# Patient Record
Sex: FEMALE | Race: OTHER | HISPANIC OR LATINO
[De-identification: names, ages, dates, MRNs, and addresses within clinical notes are randomized per-mention and may not be internally consistent; named-entity substitution may affect disease eponyms.]

---

## 2017-08-29 PROBLEM — Z00.00 ENCOUNTER FOR PREVENTIVE HEALTH EXAMINATION: Status: ACTIVE | Noted: 2017-08-29

## 2017-09-06 ENCOUNTER — APPOINTMENT (OUTPATIENT)
Dept: ANTEPARTUM | Facility: CLINIC | Age: 22
End: 2017-09-06
Payer: SELF-PAY

## 2017-09-06 ENCOUNTER — ASOB RESULT (OUTPATIENT)
Age: 22
End: 2017-09-06

## 2017-09-06 PROCEDURE — 76805 OB US >/= 14 WKS SNGL FETUS: CPT

## 2017-09-06 PROCEDURE — 76819 FETAL BIOPHYS PROFIL W/O NST: CPT

## 2017-10-30 ENCOUNTER — ASOB RESULT (OUTPATIENT)
Age: 22
End: 2017-10-30

## 2017-10-30 ENCOUNTER — APPOINTMENT (OUTPATIENT)
Dept: ANTEPARTUM | Facility: CLINIC | Age: 22
End: 2017-10-30
Payer: MEDICAID

## 2017-10-30 VITALS
DIASTOLIC BLOOD PRESSURE: 68 MMHG | RESPIRATION RATE: 16 BRPM | OXYGEN SATURATION: 98 % | HEART RATE: 76 BPM | SYSTOLIC BLOOD PRESSURE: 120 MMHG

## 2017-10-30 PROCEDURE — 76818 FETAL BIOPHYS PROFILE W/NST: CPT

## 2017-10-30 PROCEDURE — 76816 OB US FOLLOW-UP PER FETUS: CPT

## 2017-10-30 RX ORDER — VITAMIN C, CALCIUM, IRON, VITAMIN D3, VITAMIN E, VITAMIN B1, VITAMIN B2, VITAMIN B3, VITAMIN B6, FOLIC ACID, IODINE, ZINC, COPPER, DOCUSATE SODIUM, DOCOSAHEXAENOIC ACID (DHA) 27-1-50 MG
KIT ORAL
Refills: 0 | Status: ACTIVE | COMMUNITY

## 2017-11-02 ENCOUNTER — ASOB RESULT (OUTPATIENT)
Age: 22
End: 2017-11-02

## 2017-11-02 ENCOUNTER — APPOINTMENT (OUTPATIENT)
Dept: ANTEPARTUM | Facility: CLINIC | Age: 22
End: 2017-11-02
Payer: MEDICAID

## 2017-11-02 DIAGNOSIS — O48.0 POST-TERM PREGNANCY: ICD-10-CM

## 2017-11-02 PROCEDURE — 76818 FETAL BIOPHYS PROFILE W/NST: CPT

## 2017-11-02 PROCEDURE — 76815 OB US LIMITED FETUS(S): CPT

## 2017-11-03 ENCOUNTER — INPATIENT (INPATIENT)
Facility: HOSPITAL | Age: 22
LOS: 3 days | Discharge: HOME | End: 2017-11-07
Attending: OBSTETRICS & GYNECOLOGY | Admitting: OBSTETRICS & GYNECOLOGY
Payer: COMMERCIAL

## 2017-11-03 VITALS — WEIGHT: 213.85 LBS | HEIGHT: 59 IN

## 2017-11-03 DIAGNOSIS — O47.1 FALSE LABOR AT OR AFTER 37 COMPLETED WEEKS OF GESTATION: ICD-10-CM

## 2017-11-03 LAB
ABO RH CONFIRMATION: SIGNIFICANT CHANGE UP
ANISOCYTOSIS BLD QL: SLIGHT — SIGNIFICANT CHANGE UP
APPEARANCE UR: CLEAR — SIGNIFICANT CHANGE UP
BASOPHILS NFR BLD AUTO: 1 % — SIGNIFICANT CHANGE UP (ref 0–2)
BILIRUB UR-MCNC: NEGATIVE — SIGNIFICANT CHANGE UP
BLD GP AB SCN SERPL QL: SIGNIFICANT CHANGE UP
COLOR SPEC: YELLOW — SIGNIFICANT CHANGE UP
DIFF PNL FLD: NEGATIVE — SIGNIFICANT CHANGE UP
EOSINOPHIL NFR BLD AUTO: 1 % — SIGNIFICANT CHANGE UP (ref 0–5)
EPI CELLS # UR: SIGNIFICANT CHANGE UP
GLUCOSE UR QL: NEGATIVE MG/DL — SIGNIFICANT CHANGE UP
HCT VFR BLD CALC: 33.7 % — LOW (ref 37–47)
HGB BLD-MCNC: 11.1 G/DL — LOW (ref 12–16)
KETONES UR-MCNC: NEGATIVE — SIGNIFICANT CHANGE UP
LEUKOCYTE ESTERASE UR-ACNC: ABNORMAL
LYMPHOCYTES # BLD AUTO: 22 % — SIGNIFICANT CHANGE UP (ref 20–55)
MACROCYTES BLD QL: SLIGHT — SIGNIFICANT CHANGE UP
MANUAL DIF COMMENT BLD-IMP: SIGNIFICANT CHANGE UP
MCHC RBC-ENTMCNC: 23.8 PG — LOW (ref 27–31)
MCHC RBC-ENTMCNC: 32.9 G/DL — SIGNIFICANT CHANGE UP (ref 32–36)
MCV RBC AUTO: 72.2 FL — LOW (ref 81–99)
MICROCYTES BLD QL: SLIGHT — SIGNIFICANT CHANGE UP
MONOCYTES NFR BLD AUTO: 6 % — SIGNIFICANT CHANGE UP (ref 3–10)
NEUTROPHILS NFR BLD AUTO: 68 % — SIGNIFICANT CHANGE UP (ref 37–73)
NEUTS BAND # BLD: 1 % — SIGNIFICANT CHANGE UP (ref 0–8)
NITRITE UR-MCNC: NEGATIVE — SIGNIFICANT CHANGE UP
PH UR: 7 — SIGNIFICANT CHANGE UP (ref 5–8)
PLAT MORPH BLD: NORMAL — SIGNIFICANT CHANGE UP
PLATELET # BLD AUTO: 273 K/UL — SIGNIFICANT CHANGE UP (ref 150–400)
PROT UR-MCNC: 30 MG/DL
RBC # BLD: 4.67 M/UL — SIGNIFICANT CHANGE UP (ref 4.4–5.2)
RBC # FLD: 16.6 % — HIGH (ref 11–15.6)
RBC BLD AUTO: PRESENT — SIGNIFICANT CHANGE UP
SP GR SPEC: 1.01 — SIGNIFICANT CHANGE UP (ref 1.01–1.02)
T PALLIDUM AB TITR SER: NEGATIVE — SIGNIFICANT CHANGE UP
TYPE + AB SCN PNL BLD: SIGNIFICANT CHANGE UP
UROBILINOGEN FLD QL: NEGATIVE MG/DL — SIGNIFICANT CHANGE UP
VARIANT LYMPHS # BLD: 1 % — SIGNIFICANT CHANGE UP (ref 0–6)
WBC # BLD: 10 K/UL — SIGNIFICANT CHANGE UP (ref 4.8–10.8)
WBC # FLD AUTO: 10 K/UL — SIGNIFICANT CHANGE UP (ref 4.8–10.8)
WBC UR QL: SIGNIFICANT CHANGE UP

## 2017-11-03 RX ORDER — OXYTOCIN 10 UNIT/ML
125 VIAL (ML) INJECTION
Qty: 20 | Refills: 0 | Status: DISCONTINUED | OUTPATIENT
Start: 2017-11-03 | End: 2017-11-04

## 2017-11-03 RX ORDER — SODIUM CHLORIDE 9 MG/ML
1000 INJECTION, SOLUTION INTRAVENOUS ONCE
Qty: 0 | Refills: 0 | Status: COMPLETED | OUTPATIENT
Start: 2017-11-03 | End: 2017-11-04

## 2017-11-03 RX ORDER — CITRIC ACID/SODIUM CITRATE 300-500 MG
30 SOLUTION, ORAL ORAL ONCE
Qty: 0 | Refills: 0 | Status: COMPLETED | OUTPATIENT
Start: 2017-11-03 | End: 2017-11-04

## 2017-11-03 RX ORDER — SODIUM CHLORIDE 9 MG/ML
1000 INJECTION, SOLUTION INTRAVENOUS
Qty: 0 | Refills: 0 | Status: DISCONTINUED | OUTPATIENT
Start: 2017-11-03 | End: 2017-11-04

## 2017-11-03 RX ADMIN — SODIUM CHLORIDE 125 MILLILITER(S): 9 INJECTION, SOLUTION INTRAVENOUS at 11:15

## 2017-11-04 ENCOUNTER — TRANSCRIPTION ENCOUNTER (OUTPATIENT)
Age: 22
End: 2017-11-04

## 2017-11-04 ENCOUNTER — RESULT REVIEW (OUTPATIENT)
Age: 22
End: 2017-11-04

## 2017-11-04 LAB
BASE EXCESS BLDCOA CALC-SCNC: -7.9 MMOL/L — SIGNIFICANT CHANGE UP (ref -11.6–0.4)
BASE EXCESS BLDCOV CALC-SCNC: -7.4 MMOL/L — SIGNIFICANT CHANGE UP (ref -9.3–0.3)
GAS PNL BLDCOV: 7.29 — SIGNIFICANT CHANGE UP (ref 7.11–7.36)
HCO3 BLDCOA-SCNC: 19 MMOL/L — SIGNIFICANT CHANGE UP (ref 15–27)
HCO3 BLDCOV-SCNC: 19 MMOL/L — SIGNIFICANT CHANGE UP (ref 17–25)
PCO2 BLDCOA: 43.4 MMHG — SIGNIFICANT CHANGE UP (ref 32.2–65.8)
PCO2 BLDCOV: 38 MMHG — SIGNIFICANT CHANGE UP (ref 27–49.4)
PH BLDCOA: 7.25 — SIGNIFICANT CHANGE UP (ref 7.11–7.36)
PO2 BLDCOA: 23.7 MMHG — SIGNIFICANT CHANGE UP (ref 6–30)
PO2 BLDCOA: 29.4 MMHG — SIGNIFICANT CHANGE UP (ref 17.4–41)
SAO2 % BLDCOA: SIGNIFICANT CHANGE UP
SAO2 % BLDCOV: SIGNIFICANT CHANGE UP

## 2017-11-04 PROCEDURE — 88307 TISSUE EXAM BY PATHOLOGIST: CPT | Mod: 26

## 2017-11-04 PROCEDURE — 59409 OBSTETRICAL CARE: CPT | Mod: U9

## 2017-11-04 RX ORDER — OXYTOCIN 10 UNIT/ML
2 VIAL (ML) INJECTION
Qty: 30 | Refills: 0 | Status: DISCONTINUED | OUTPATIENT
Start: 2017-11-04 | End: 2017-11-04

## 2017-11-04 RX ORDER — IBUPROFEN 200 MG
600 TABLET ORAL EVERY 6 HOURS
Qty: 0 | Refills: 0 | Status: DISCONTINUED | OUTPATIENT
Start: 2017-11-05 | End: 2017-11-07

## 2017-11-04 RX ORDER — SODIUM CHLORIDE 9 MG/ML
1000 INJECTION, SOLUTION INTRAVENOUS
Qty: 0 | Refills: 0 | Status: DISCONTINUED | OUTPATIENT
Start: 2017-11-05 | End: 2017-11-07

## 2017-11-04 RX ORDER — CEFAZOLIN SODIUM 1 G
2000 VIAL (EA) INJECTION ONCE
Qty: 0 | Refills: 0 | Status: COMPLETED | OUTPATIENT
Start: 2017-11-04 | End: 2017-11-04

## 2017-11-04 RX ORDER — KETOROLAC TROMETHAMINE 30 MG/ML
30 SYRINGE (ML) INJECTION ONCE
Qty: 0 | Refills: 0 | Status: DISCONTINUED | OUTPATIENT
Start: 2017-11-05 | End: 2017-11-07

## 2017-11-04 RX ORDER — NALOXONE HYDROCHLORIDE 4 MG/.1ML
0.1 SPRAY NASAL
Qty: 0 | Refills: 0 | Status: DISCONTINUED | OUTPATIENT
Start: 2017-11-05 | End: 2017-11-07

## 2017-11-04 RX ORDER — SIMETHICONE 80 MG/1
80 TABLET, CHEWABLE ORAL EVERY 4 HOURS
Qty: 0 | Refills: 0 | Status: DISCONTINUED | OUTPATIENT
Start: 2017-11-05 | End: 2017-11-07

## 2017-11-04 RX ORDER — OXYTOCIN 10 UNIT/ML
41.67 VIAL (ML) INJECTION
Qty: 20 | Refills: 0 | Status: DISCONTINUED | OUTPATIENT
Start: 2017-11-05 | End: 2017-11-07

## 2017-11-04 RX ORDER — TETANUS TOXOID, REDUCED DIPHTHERIA TOXOID AND ACELLULAR PERTUSSIS VACCINE, ADSORBED 5; 2.5; 8; 8; 2.5 [IU]/.5ML; [IU]/.5ML; UG/.5ML; UG/.5ML; UG/.5ML
0.5 SUSPENSION INTRAMUSCULAR ONCE
Qty: 0 | Refills: 0 | Status: DISCONTINUED | OUTPATIENT
Start: 2017-11-05 | End: 2017-11-07

## 2017-11-04 RX ORDER — DIPHENHYDRAMINE HCL 50 MG
25 CAPSULE ORAL ONCE
Qty: 0 | Refills: 0 | Status: DISCONTINUED | OUTPATIENT
Start: 2017-11-05 | End: 2017-11-07

## 2017-11-04 RX ORDER — OXYCODONE AND ACETAMINOPHEN 5; 325 MG/1; MG/1
1 TABLET ORAL
Qty: 0 | Refills: 0 | Status: DISCONTINUED | OUTPATIENT
Start: 2017-11-05 | End: 2017-11-07

## 2017-11-04 RX ORDER — ACETAMINOPHEN 500 MG
650 TABLET ORAL EVERY 6 HOURS
Qty: 0 | Refills: 0 | Status: DISCONTINUED | OUTPATIENT
Start: 2017-11-05 | End: 2017-11-07

## 2017-11-04 RX ORDER — OXYTOCIN 10 UNIT/ML
333.33 VIAL (ML) INJECTION
Qty: 20 | Refills: 0 | Status: DISCONTINUED | OUTPATIENT
Start: 2017-11-04 | End: 2017-11-07

## 2017-11-04 RX ORDER — LANOLIN
1 OINTMENT (GRAM) TOPICAL
Qty: 0 | Refills: 0 | Status: DISCONTINUED | OUTPATIENT
Start: 2017-11-05 | End: 2017-11-07

## 2017-11-04 RX ORDER — ONDANSETRON 8 MG/1
4 TABLET, FILM COATED ORAL ONCE
Qty: 0 | Refills: 0 | Status: DISCONTINUED | OUTPATIENT
Start: 2017-11-05 | End: 2017-11-07

## 2017-11-04 RX ORDER — FERROUS SULFATE 325(65) MG
325 TABLET ORAL DAILY
Qty: 0 | Refills: 0 | Status: DISCONTINUED | OUTPATIENT
Start: 2017-11-05 | End: 2017-11-07

## 2017-11-04 RX ORDER — DOCUSATE SODIUM 100 MG
100 CAPSULE ORAL
Qty: 0 | Refills: 0 | Status: DISCONTINUED | OUTPATIENT
Start: 2017-11-05 | End: 2017-11-07

## 2017-11-04 RX ORDER — DIPHENHYDRAMINE HCL 50 MG
25 CAPSULE ORAL EVERY 4 HOURS
Qty: 0 | Refills: 0 | Status: DISCONTINUED | OUTPATIENT
Start: 2017-11-05 | End: 2017-11-07

## 2017-11-04 RX ORDER — KETOROLAC TROMETHAMINE 30 MG/ML
30 SYRINGE (ML) INJECTION EVERY 6 HOURS
Qty: 0 | Refills: 0 | Status: DISCONTINUED | OUTPATIENT
Start: 2017-11-05 | End: 2017-11-07

## 2017-11-04 RX ORDER — ACETAMINOPHEN 500 MG
1000 TABLET ORAL ONCE
Qty: 0 | Refills: 0 | Status: COMPLETED | OUTPATIENT
Start: 2017-11-04 | End: 2017-11-04

## 2017-11-04 RX ORDER — ACETAMINOPHEN 500 MG
1000 TABLET ORAL ONCE
Qty: 0 | Refills: 0 | Status: DISCONTINUED | OUTPATIENT
Start: 2017-11-05 | End: 2017-11-07

## 2017-11-04 RX ORDER — GLYCERIN ADULT
1 SUPPOSITORY, RECTAL RECTAL AT BEDTIME
Qty: 0 | Refills: 0 | Status: DISCONTINUED | OUTPATIENT
Start: 2017-11-05 | End: 2017-11-07

## 2017-11-04 RX ORDER — OXYTOCIN 10 UNIT/ML
41.67 VIAL (ML) INJECTION
Qty: 20 | Refills: 0 | Status: DISCONTINUED | OUTPATIENT
Start: 2017-11-04 | End: 2017-11-07

## 2017-11-04 RX ORDER — ONDANSETRON 8 MG/1
4 TABLET, FILM COATED ORAL EVERY 6 HOURS
Qty: 0 | Refills: 0 | Status: DISCONTINUED | OUTPATIENT
Start: 2017-11-05 | End: 2017-11-07

## 2017-11-04 RX ORDER — DIPHENHYDRAMINE HCL 50 MG
25 CAPSULE ORAL EVERY 6 HOURS
Qty: 0 | Refills: 0 | Status: DISCONTINUED | OUTPATIENT
Start: 2017-11-05 | End: 2017-11-07

## 2017-11-04 RX ORDER — OXYCODONE AND ACETAMINOPHEN 5; 325 MG/1; MG/1
2 TABLET ORAL EVERY 6 HOURS
Qty: 0 | Refills: 0 | Status: DISCONTINUED | OUTPATIENT
Start: 2017-11-05 | End: 2017-11-07

## 2017-11-04 RX ADMIN — Medication 30 MILLILITER(S): at 17:01

## 2017-11-04 RX ADMIN — Medication 0.2 MILLIGRAM(S): at 20:39

## 2017-11-04 RX ADMIN — Medication 2 MILLIUNIT(S)/MIN: at 14:58

## 2017-11-04 RX ADMIN — SODIUM CHLORIDE 125 MILLILITER(S): 9 INJECTION, SOLUTION INTRAVENOUS at 03:00

## 2017-11-04 RX ADMIN — SODIUM CHLORIDE 1000 MILLILITER(S): 9 INJECTION INTRAMUSCULAR; INTRAVENOUS; SUBCUTANEOUS at 21:05

## 2017-11-04 RX ADMIN — CARBOPROST TROMETHAMINE 250 MICROGRAM(S): 250 INJECTION, SOLUTION INTRAMUSCULAR at 20:44

## 2017-11-04 RX ADMIN — CARBOPROST TROMETHAMINE 250 MICROGRAM(S): 250 INJECTION, SOLUTION INTRAMUSCULAR at 20:41

## 2017-11-04 RX ADMIN — Medication 400 MILLIGRAM(S): at 23:38

## 2017-11-04 RX ADMIN — SODIUM CHLORIDE 2000 MILLILITER(S): 9 INJECTION, SOLUTION INTRAVENOUS at 10:41

## 2017-11-04 RX ADMIN — Medication 100 MILLIGRAM(S): at 20:10

## 2017-11-04 RX ADMIN — Medication 1000 MILLIUNIT(S)/MIN: at 20:30

## 2017-11-04 RX ADMIN — SODIUM CHLORIDE 125 MILLILITER(S): 9 INJECTION, SOLUTION INTRAVENOUS at 17:01

## 2017-11-04 RX ADMIN — Medication 125 MILLIUNIT(S)/MIN: at 21:30

## 2017-11-05 ENCOUNTER — TRANSCRIPTION ENCOUNTER (OUTPATIENT)
Age: 22
End: 2017-11-05

## 2017-11-05 LAB
EOSINOPHIL # BLD AUTO: 0 K/UL — SIGNIFICANT CHANGE UP (ref 0–0.5)
EOSINOPHIL NFR BLD AUTO: 0 % — SIGNIFICANT CHANGE UP (ref 0–6)
HCT VFR BLD CALC: 33.5 % — LOW (ref 37–47)
HGB BLD-MCNC: 11.5 G/DL — LOW (ref 12–16)
LYMPHOCYTES # BLD AUTO: 1.1 K/UL — SIGNIFICANT CHANGE UP (ref 1–4.8)
LYMPHOCYTES # BLD AUTO: 6.7 % — LOW (ref 20–55)
MCHC RBC-ENTMCNC: 26 PG — LOW (ref 27–31)
MCHC RBC-ENTMCNC: 34.3 G/DL — SIGNIFICANT CHANGE UP (ref 32–36)
MCV RBC AUTO: 75.8 FL — LOW (ref 81–99)
MONOCYTES # BLD AUTO: 0.7 K/UL — SIGNIFICANT CHANGE UP (ref 0–0.8)
MONOCYTES NFR BLD AUTO: 4.3 % — SIGNIFICANT CHANGE UP (ref 3–10)
NEUTROPHILS # BLD AUTO: 14.9 K/UL — HIGH (ref 1.8–8)
NEUTROPHILS NFR BLD AUTO: 88.7 % — HIGH (ref 37–73)
PLATELET # BLD AUTO: 219 K/UL — SIGNIFICANT CHANGE UP (ref 150–400)
RBC # BLD: 4.42 M/UL — SIGNIFICANT CHANGE UP (ref 4.4–5.2)
RBC # FLD: 17.1 % — HIGH (ref 11–15.6)
WBC # BLD: 16.8 K/UL — HIGH (ref 4.8–10.8)
WBC # FLD AUTO: 16.8 K/UL — HIGH (ref 4.8–10.8)

## 2017-11-05 RX ORDER — CARBOPROST TROMETHAMINE 250 UG/ML
250 INJECTION, SOLUTION INTRAMUSCULAR ONCE
Qty: 0 | Refills: 0 | Status: COMPLETED | OUTPATIENT
Start: 2017-11-05 | End: 2017-11-04

## 2017-11-05 RX ORDER — ACETAMINOPHEN 500 MG
650 TABLET ORAL EVERY 6 HOURS
Qty: 0 | Refills: 0 | Status: DISCONTINUED | OUTPATIENT
Start: 2017-11-05 | End: 2017-11-05

## 2017-11-05 RX ORDER — LANOLIN
1 OINTMENT (GRAM) TOPICAL
Qty: 0 | Refills: 0 | Status: DISCONTINUED | OUTPATIENT
Start: 2017-11-05 | End: 2017-11-05

## 2017-11-05 RX ORDER — FERROUS SULFATE 325(65) MG
325 TABLET ORAL DAILY
Qty: 0 | Refills: 0 | Status: DISCONTINUED | OUTPATIENT
Start: 2017-11-05 | End: 2017-11-05

## 2017-11-05 RX ORDER — CEFAZOLIN SODIUM 1 G
2000 VIAL (EA) INJECTION EVERY 8 HOURS
Qty: 0 | Refills: 0 | Status: DISCONTINUED | OUTPATIENT
Start: 2017-11-05 | End: 2017-11-06

## 2017-11-05 RX ORDER — GLYCERIN ADULT
1 SUPPOSITORY, RECTAL RECTAL AT BEDTIME
Qty: 0 | Refills: 0 | Status: DISCONTINUED | OUTPATIENT
Start: 2017-11-05 | End: 2017-11-05

## 2017-11-05 RX ORDER — DOCUSATE SODIUM 100 MG
100 CAPSULE ORAL
Qty: 0 | Refills: 0 | Status: DISCONTINUED | OUTPATIENT
Start: 2017-11-05 | End: 2017-11-05

## 2017-11-05 RX ORDER — OXYTOCIN 10 UNIT/ML
41.67 VIAL (ML) INJECTION
Qty: 20 | Refills: 0 | Status: DISCONTINUED | OUTPATIENT
Start: 2017-11-05 | End: 2017-11-05

## 2017-11-05 RX ORDER — OXYCODONE AND ACETAMINOPHEN 5; 325 MG/1; MG/1
1 TABLET ORAL
Qty: 0 | Refills: 0 | Status: DISCONTINUED | OUTPATIENT
Start: 2017-11-05 | End: 2017-11-05

## 2017-11-05 RX ORDER — IBUPROFEN 200 MG
600 TABLET ORAL EVERY 6 HOURS
Qty: 0 | Refills: 0 | Status: DISCONTINUED | OUTPATIENT
Start: 2017-11-05 | End: 2017-11-05

## 2017-11-05 RX ORDER — OXYCODONE AND ACETAMINOPHEN 5; 325 MG/1; MG/1
2 TABLET ORAL EVERY 6 HOURS
Qty: 0 | Refills: 0 | Status: DISCONTINUED | OUTPATIENT
Start: 2017-11-05 | End: 2017-11-05

## 2017-11-05 RX ORDER — DIPHENHYDRAMINE HCL 50 MG
25 CAPSULE ORAL EVERY 6 HOURS
Qty: 0 | Refills: 0 | Status: DISCONTINUED | OUTPATIENT
Start: 2017-11-05 | End: 2017-11-05

## 2017-11-05 RX ORDER — SODIUM CHLORIDE 9 MG/ML
1000 INJECTION INTRAMUSCULAR; INTRAVENOUS; SUBCUTANEOUS ONCE
Qty: 0 | Refills: 0 | Status: COMPLETED | OUTPATIENT
Start: 2017-11-05 | End: 2017-11-04

## 2017-11-05 RX ORDER — SIMETHICONE 80 MG/1
80 TABLET, CHEWABLE ORAL EVERY 4 HOURS
Qty: 0 | Refills: 0 | Status: DISCONTINUED | OUTPATIENT
Start: 2017-11-05 | End: 2017-11-05

## 2017-11-05 RX ADMIN — Medication 100 MILLIGRAM(S): at 14:06

## 2017-11-05 RX ADMIN — Medication 1000 MILLIGRAM(S): at 00:05

## 2017-11-05 RX ADMIN — Medication 30 MILLIGRAM(S): at 18:35

## 2017-11-05 RX ADMIN — SODIUM CHLORIDE 125 MILLILITER(S): 9 INJECTION, SOLUTION INTRAVENOUS at 14:07

## 2017-11-05 RX ADMIN — Medication 100 MILLIGRAM(S): at 04:37

## 2017-11-05 RX ADMIN — Medication 30 MILLIGRAM(S): at 18:21

## 2017-11-05 RX ADMIN — Medication 30 MILLIGRAM(S): at 10:04

## 2017-11-05 RX ADMIN — Medication 30 MILLIGRAM(S): at 10:19

## 2017-11-05 RX ADMIN — Medication 100 MILLIGRAM(S): at 20:00

## 2017-11-05 NOTE — DISCHARGE NOTE ADULT - CARE PROVIDER_API CALL
[facility],   [address] [place] [address]  Phone: [phone]  Fax: (       -

## 2017-11-05 NOTE — DISCHARGE NOTE ADULT - PROVIDER TOKENS
FREE:[LAST:[HR],PHONE:[phone]],FAX:[(   )    -],ADDRESS:[[address] [place] [address]]]

## 2017-11-05 NOTE — PROGRESS NOTE ADULT - PROBLEM SELECTOR PLAN 2
Resolving, stable  -repeat H/H stable (11.1/33.7 on 11/3/17  to 11.5/33.5 postpartum on 11/5/17) Stable  -repeat H/H stable (11.1/33.7 on 11/3/17  to 11.5/33.5 postpartum on 11/5/17)

## 2017-11-05 NOTE — DISCHARGE NOTE ADULT - HOSPITAL COURSE
Patient is a  who delivered via  section. She was transferred to postpartum unit without complications during her stay. Upon discharge she is voiding, tolerating PO, ambulating, and pain is controlled.

## 2017-11-05 NOTE — DISCHARGE NOTE ADULT - MEDICATION SUMMARY - MEDICATIONS TO TAKE
I will START or STAY ON the medications listed below when I get home from the hospital:    ibuprofen 600 mg oral tablet  -- 1 tab(s) by mouth every 6 hours, As needed, Mild pain or headache  -- Indication: For moderate pain    Prenatal 1 oral capsule  -- 1  orally  -- Indication: For home med

## 2017-11-05 NOTE — DISCHARGE NOTE ADULT - PATIENT PORTAL LINK FT
“You can access the [employer] Patient Portal, offered by [facility], by registering with the following website: [email or web address]”

## 2017-11-05 NOTE — DISCHARGE NOTE ADULT - CARE PLAN
Principal Discharge DX:	 delivery delivered  Goal:	pain free  Instructions for follow-up, activity and diet:	Please follow up in our office in 5-7 days for wound check.  Please call sooner if there are any additional concerns.

## 2017-11-05 NOTE — DISCHARGE NOTE ADULT - PLAN OF CARE
pain free Please follow up in our office in 5-7 days for wound check.  Please call sooner if there are any additional concerns.

## 2017-11-06 LAB
BASOPHILS # BLD AUTO: 0 K/UL — SIGNIFICANT CHANGE UP (ref 0–0.2)
BASOPHILS NFR BLD AUTO: 0.2 % — SIGNIFICANT CHANGE UP (ref 0–2)
EOSINOPHIL # BLD AUTO: 0 K/UL — SIGNIFICANT CHANGE UP (ref 0–0.5)
EOSINOPHIL NFR BLD AUTO: 0.4 % — SIGNIFICANT CHANGE UP (ref 0–6)
HCT VFR BLD CALC: 23 % — LOW (ref 37–47)
HGB BLD-MCNC: 7.7 G/DL — LOW (ref 12–16)
LYMPHOCYTES # BLD AUTO: 2.8 K/UL — SIGNIFICANT CHANGE UP (ref 1–4.8)
LYMPHOCYTES # BLD AUTO: 25 % — SIGNIFICANT CHANGE UP (ref 20–55)
MCHC RBC-ENTMCNC: 25.8 PG — LOW (ref 27–31)
MCHC RBC-ENTMCNC: 33.5 G/DL — SIGNIFICANT CHANGE UP (ref 32–36)
MCV RBC AUTO: 77.2 FL — LOW (ref 81–99)
MONOCYTES # BLD AUTO: 0.8 K/UL — SIGNIFICANT CHANGE UP (ref 0–0.8)
MONOCYTES NFR BLD AUTO: 6.8 % — SIGNIFICANT CHANGE UP (ref 3–10)
NEUTROPHILS # BLD AUTO: 7.5 K/UL — SIGNIFICANT CHANGE UP (ref 1.8–8)
NEUTROPHILS NFR BLD AUTO: 67 % — SIGNIFICANT CHANGE UP (ref 37–73)
PLATELET # BLD AUTO: 185 K/UL — SIGNIFICANT CHANGE UP (ref 150–400)
RBC # BLD: 2.98 M/UL — LOW (ref 4.4–5.2)
RBC # FLD: 18.3 % — HIGH (ref 11–15.6)
WBC # BLD: 11.2 K/UL — HIGH (ref 4.8–10.8)
WBC # FLD AUTO: 11.2 K/UL — HIGH (ref 4.8–10.8)

## 2017-11-06 RX ORDER — IBUPROFEN 200 MG
1 TABLET ORAL
Qty: 56 | Refills: 0
Start: 2017-11-06 | End: 2017-11-20

## 2017-11-06 RX ADMIN — OXYCODONE AND ACETAMINOPHEN 1 TABLET(S): 5; 325 TABLET ORAL at 14:00

## 2017-11-06 RX ADMIN — OXYCODONE AND ACETAMINOPHEN 2 TABLET(S): 5; 325 TABLET ORAL at 03:52

## 2017-11-06 RX ADMIN — OXYCODONE AND ACETAMINOPHEN 1 TABLET(S): 5; 325 TABLET ORAL at 21:20

## 2017-11-06 RX ADMIN — OXYCODONE AND ACETAMINOPHEN 1 TABLET(S): 5; 325 TABLET ORAL at 13:01

## 2017-11-06 RX ADMIN — OXYCODONE AND ACETAMINOPHEN 2 TABLET(S): 5; 325 TABLET ORAL at 04:46

## 2017-11-06 RX ADMIN — OXYCODONE AND ACETAMINOPHEN 1 TABLET(S): 5; 325 TABLET ORAL at 22:15

## 2017-11-06 RX ADMIN — Medication 100 MILLIGRAM(S): at 03:53

## 2017-11-06 RX ADMIN — Medication 1 TABLET(S): at 13:01

## 2017-11-06 RX ADMIN — Medication 600 MILLIGRAM(S): at 08:10

## 2017-11-06 RX ADMIN — Medication 325 MILLIGRAM(S): at 13:07

## 2017-11-06 RX ADMIN — Medication 600 MILLIGRAM(S): at 09:00

## 2017-11-07 VITALS
RESPIRATION RATE: 18 BRPM | TEMPERATURE: 98 F | HEART RATE: 93 BPM | DIASTOLIC BLOOD PRESSURE: 70 MMHG | SYSTOLIC BLOOD PRESSURE: 113 MMHG

## 2017-11-07 PROCEDURE — 36415 COLL VENOUS BLD VENIPUNCTURE: CPT

## 2017-11-07 PROCEDURE — 85027 COMPLETE CBC AUTOMATED: CPT

## 2017-11-07 PROCEDURE — T1013: CPT

## 2017-11-07 PROCEDURE — 86850 RBC ANTIBODY SCREEN: CPT

## 2017-11-07 PROCEDURE — 86920 COMPATIBILITY TEST SPIN: CPT

## 2017-11-07 PROCEDURE — 86780 TREPONEMA PALLIDUM: CPT

## 2017-11-07 PROCEDURE — 36430 TRANSFUSION BLD/BLD COMPNT: CPT

## 2017-11-07 PROCEDURE — 88307 TISSUE EXAM BY PATHOLOGIST: CPT

## 2017-11-07 PROCEDURE — 82803 BLOOD GASES ANY COMBINATION: CPT

## 2017-11-07 PROCEDURE — 81001 URINALYSIS AUTO W/SCOPE: CPT

## 2017-11-07 PROCEDURE — 86901 BLOOD TYPING SEROLOGIC RH(D): CPT

## 2017-11-07 PROCEDURE — 59050 FETAL MONITOR W/REPORT: CPT

## 2017-11-07 PROCEDURE — 86900 BLOOD TYPING SEROLOGIC ABO: CPT

## 2017-11-07 PROCEDURE — P9016: CPT

## 2017-11-07 RX ORDER — FERROUS SULFATE 325(65) MG
1 TABLET ORAL
Qty: 90 | Refills: 0
Start: 2017-11-07 | End: 2017-12-07

## 2017-11-07 RX ADMIN — OXYCODONE AND ACETAMINOPHEN 2 TABLET(S): 5; 325 TABLET ORAL at 01:46

## 2017-11-07 RX ADMIN — OXYCODONE AND ACETAMINOPHEN 2 TABLET(S): 5; 325 TABLET ORAL at 02:35

## 2017-11-07 NOTE — PROGRESS NOTE ADULT - PROBLEM SELECTOR PROBLEM 2
Uterine atony, postpartum, current hospitalization

## 2017-11-07 NOTE — PROGRESS NOTE ADULT - SUBJECTIVE AND OBJECTIVE BOX
Postpartum Note,  Section  Patient is 22y s/p  post-operative day 1.    Subjective:  No acute events overnight. The patient is feeling well. Her vomiting subsided but there is still some nausea.  She is not yet having bowel movements or flatus. Patient is having normal postpartum bleeding which is decreasing in amount.  She is breastfeeding and the baby is latching on.      Physical exam:    Vital Signs Last 24 Hrs  T(C): 36.9 (2017 05:05), Max: 36.9 (2017 04:45)  T(F): 98.4 (2017 05:05), Max: 98.4 (2017 04:45)  HR: 76 (2017 05:05) (64 - 95)  BP: 123/80 (2017 05:05) (116/49 - 133/80)  RR: 18 (2017 05:05) (15 - 22)  SpO2: 100% (2017 04:45) (98% - 100%)    Lungs: CTABL  Heart: Regular rate and rhythm  Abdomen: Soft, nontender, no distension, firm uterine fundus, the incision is clean dry and intact  Ext: No DVT signs, warm extremities    LABS:                        11.5   16.8  )-----------( 219      ( 2017 02:28 )             33.5             Urinalysis Basic - ( 2017 11:47 )    Color: Yellow / Appearance: Clear / S.010 / pH: x  Gluc: x / Ketone: Negative  / Bili: Negative / Urobili: Negative mg/dL   Blood: x / Protein: 30 mg/dL / Nitrite: Negative   Leuk Esterase: Small / RBC: x / WBC 3-5   Sq Epi: x / Non Sq Epi: Occasional / Bacteria: x
21 yo now  s/p C/S for failure to progress @ 41 weeks with uterine atony, appx 900 cc EBL, POD1. Pt seen and examined at bedside. Pt is doing well, denies n/v, f/c, CP/SOB. +Flatus, has not voided yet, no BM yet. Pt has ambulated, is breastfeeding exclusively. States pain is controlled on medication, minimal vaginal bleeding. No lightheadedness, headache, weakness, SOB, paresthesias, leg/calf pain, changes in vision.     Vital Signs Last 24 Hrs  T(C): 36.9 (2017 05:05), Max: 36.9 (2017 04:45)  T(F): 98.4 (2017 05:05), Max: 98.4 (2017 04:45)  HR: 76 (2017 05:05) (64 - 95)  BP: 123/80 (2017 05:05) (116/49 - 133/80)  BP(mean): --  RR: 18 (2017 05:05) (15 - 22)  SpO2: 100% (2017 04:45) (98% - 100%)    General: NAD, sitting up in bed, pleasant  HEENT: NCAT  Respiratory: CTABL  CV: S1S2 with RRR  Abdominal: Soft, +BS x 4 quadrants, uterine fundus boggy appx 1.5cm below umbilicus  Pelvic: Staples in place, Clean/Dry/Intact. (Non-erythematous, no discharge at surgical site); minimal lochia, no visible clots  Extremities: No edema b/l; no calf tenderness                          11.5   16.8  )-----------( 219      ( 2017 02:28 )             33.5   MEDICATIONS  (STANDING):  ceFAZolin   IVPB 2000 milliGRAM(s) IV Intermittent every 8 hours  diphtheria/tetanus/pertussis (acellular) Vaccine (ADAcel) 0.5 milliLiter(s) IntraMuscular once  diphtheria/tetanus/pertussis (acellular) Vaccine (ADAcel) 0.5 milliLiter(s) IntraMuscular once  ferrous    sulfate 325 milliGRAM(s) Oral daily  lactated ringers. 1000 milliLiter(s) (125 mL/Hr) IV Continuous <Continuous>  lactated ringers. 1000 milliLiter(s) (125 mL/Hr) IV Continuous <Continuous>  lactated ringers. 1000 milliLiter(s) (125 mL/Hr) IV Continuous <Continuous>  oxytocin Infusion 41.667 milliUNIT(s)/Min (125 mL/Hr) IV Continuous <Continuous>  oxytocin Infusion 333.333 milliUNIT(s)/Min (1000 mL/Hr) IV Continuous <Continuous>  oxytocin Infusion 41.667 milliUNIT(s)/Min (125 mL/Hr) IV Continuous <Continuous>  prenatal multivitamin 1 Tablet(s) Oral daily    MEDICATIONS  (PRN):  acetaminophen   Tablet 650 milliGRAM(s) Oral every 6 hours PRN For Temp greater than 38.5 C (101.3 F)  acetaminophen  IVPB. 1000 milliGRAM(s) IV Intermittent once PRN Moderate Pain (4 - 6)  diphenhydrAMINE   Capsule 25 milliGRAM(s) Oral every 6 hours PRN Itching  diphenhydrAMINE   Capsule 25 milliGRAM(s) Oral every 4 hours PRN Pruritus  diphenhydrAMINE   Injectable 25 milliGRAM(s) IV Push once PRN Itching  docusate sodium 100 milliGRAM(s) Oral two times a day PRN Stool Softening  glycerin Suppository - Adult 1 Suppository(s) Rectal at bedtime PRN Constipation  ibuprofen  Tablet 600 milliGRAM(s) Oral every 6 hours PRN Mild pain or headache  ketorolac   Injectable 30 milliGRAM(s) IV Push every 6 hours PRN Moderate Pain (4 - 6)  ketorolac   Injectable 30 milliGRAM(s) IV Push once PRN Moderate Pain  lanolin Ointment 1 Application(s) Topical every 3 hours PRN Sore Nipples  naloxone Injectable 0.1 milliGRAM(s) IV Push every 3 minutes PRN For ANY of the following changes in patient status:  A. RR LESS THAN 10 breaths per minute, B. Oxygen saturation LESS THAN 90%, C. Sedation score of 6  ondansetron Injectable 4 milliGRAM(s) IV Push every 6 hours PRN Nausea  ondansetron Injectable 4 milliGRAM(s) IV Push once PRN Postoperative Nausea and  Vomiting  oxyCODONE    5 mG/acetaminophen 325 mG 1 Tablet(s) Oral every 3 hours PRN Moderate Pain  oxyCODONE    5 mG/acetaminophen 325 mG 2 Tablet(s) Oral every 6 hours PRN Severe Pain  simethicone 80 milliGRAM(s) Chew every 4 hours PRN Gas
21 yo now  s/p C/S for failure to progress @ 41 weeks with uterine atony,, POD2  Patient is 22y s/p  post-operative day 2.      Patient seen at bedside. No acute events overnight. She is ambulating to the bathroom, voiding, positive flatus, no BM yet since delivery. She states that she has pain when she coughs. States that she has some mild bleeding, having changed 2 pads yesterday. She is breastfeeding.     Vital Signs Last 24 Hrs  T(C): 37.2 (2017 20:36), Max: 37.2 (2017 20:36)  T(F): 99 (2017 20:36), Max: 99 (2017 20:36)  HR: 72 (2017 20:36) (72 - 77)  BP: 126/76 (2017 20:36) (119/82 - 126/84)  RR: 18 (2017 20:36) (18 - 18)    Physical exam:   Lungs: CTABL  Heart: Regular rate and rhythm  Abdomen: Soft, nontender, no distension, firm uterine fundus, the incision is clean dry and intact  Ext: No DVT signs, warm extremities, no cyanosis    Labs:               11.5   16.8  )-----------( 219      ( 2017 02:28 )             33.5           Urinalysis Basic - ( 2017 11:47 )    Color: Yellow / Appearance: Clear / S.010 / pH: x  Gluc: x / Ketone: Negative  / Bili: Negative / Urobili: Negative mg/dL   Blood: x / Protein: 30 mg/dL / Nitrite: Negative   Leuk Esterase: Small / RBC: x / WBC 3-5   Sq Epi: x / Non Sq Epi: Occasional / Bacteria: x        MEDICATIONS  (STANDING):  ceFAZolin   IVPB 2000 milliGRAM(s) IV Intermittent every 8 hours  diphtheria/tetanus/pertussis (acellular) Vaccine (ADAcel) 0.5 milliLiter(s) IntraMuscular once  diphtheria/tetanus/pertussis (acellular) Vaccine (ADAcel) 0.5 milliLiter(s) IntraMuscular once  ferrous    sulfate 325 milliGRAM(s) Oral daily  lactated ringers. 1000 milliLiter(s) (125 mL/Hr) IV Continuous <Continuous>  lactated ringers. 1000 milliLiter(s) (125 mL/Hr) IV Continuous <Continuous>  lactated ringers. 1000 milliLiter(s) (125 mL/Hr) IV Continuous <Continuous>  oxytocin Infusion 41.667 milliUNIT(s)/Min (125 mL/Hr) IV Continuous <Continuous>  oxytocin Infusion 333.333 milliUNIT(s)/Min (1000 mL/Hr) IV Continuous <Continuous>  oxytocin Infusion 41.667 milliUNIT(s)/Min (125 mL/Hr) IV Continuous <Continuous>  prenatal multivitamin 1 Tablet(s) Oral daily    MEDICATIONS  (PRN):  acetaminophen   Tablet 650 milliGRAM(s) Oral every 6 hours PRN For Temp greater than 38.5 C (101.3 F)  acetaminophen  IVPB. 1000 milliGRAM(s) IV Intermittent once PRN Moderate Pain (4 - 6)  diphenhydrAMINE   Capsule 25 milliGRAM(s) Oral every 6 hours PRN Itching  diphenhydrAMINE   Capsule 25 milliGRAM(s) Oral every 4 hours PRN Pruritus  diphenhydrAMINE   Injectable 25 milliGRAM(s) IV Push once PRN Itching  docusate sodium 100 milliGRAM(s) Oral two times a day PRN Stool Softening  glycerin Suppository - Adult 1 Suppository(s) Rectal at bedtime PRN Constipation  ibuprofen  Tablet 600 milliGRAM(s) Oral every 6 hours PRN Mild pain or headache  ketorolac   Injectable 30 milliGRAM(s) IV Push every 6 hours PRN Moderate Pain (4 - 6)  ketorolac   Injectable 30 milliGRAM(s) IV Push once PRN Moderate Pain  lanolin Ointment 1 Application(s) Topical every 3 hours PRN Sore Nipples  naloxone Injectable 0.1 milliGRAM(s) IV Push every 3 minutes PRN For ANY of the following changes in patient status:  A. RR LESS THAN 10 breaths per minute, B. Oxygen saturation LESS THAN 90%, C. Sedation score of 6  ondansetron Injectable 4 milliGRAM(s) IV Push every 6 hours PRN Nausea  ondansetron Injectable 4 milliGRAM(s) IV Push once PRN Postoperative Nausea and  Vomiting  oxyCODONE    5 mG/acetaminophen 325 mG 1 Tablet(s) Oral every 3 hours PRN Moderate Pain  oxyCODONE    5 mG/acetaminophen 325 mG 2 Tablet(s) Oral every 6 hours PRN Severe Pain  simethicone 80 milliGRAM(s) Chew every 4 hours PRN Gas
INTERVAL HPI/OVERNIGHT EVENTS:  22y Female s/p c section under epidural anesthesia with duramorph for post op analgesia on 11/4/17    Vital Signs Last 24 Hrs  T(C): 36.9 (05 Nov 2017 05:05), Max: 36.9 (05 Nov 2017 04:45)  T(F): 98.4 (05 Nov 2017 05:05), Max: 98.4 (05 Nov 2017 04:45)  HR: 76 (05 Nov 2017 05:05) (64 - 95)  BP: 123/80 (05 Nov 2017 05:05) (116/49 - 133/80)  BP(mean): --  RR: 18 (05 Nov 2017 05:05) (15 - 22)  SpO2: 100% (05 Nov 2017 04:45) (98% - 100%)    Patient seen, doing well, no anesthetic complications or complaints noted or reported.  Pain is controlled.
Postpartum Note,  Section  She is a  22y woman who is now post-operative day #  3, stable condition, PP precautions reviewed    Subjective:  Patient feeling well, ambulating, breastfeeding   Denies any nausea and vomiting      Vital Signs Last 24 Hrs  T(C): 36.9 (2017 21:40), Max: 36.9 (2017 21:40)  T(F): 98.4 (2017 21:40), Max: 98.4 (2017 21:40)  HR: 117 (2017 21:40) (96 - 117)  BP: 138/83 (2017 21:40) (118/74 - 138/83)  BP(mean): --  RR: 18 (2017 09:07) (18 - 18)  SpO2: --    Physical:  Lungs: Normal  Heart: Regular rate and rhythm  Abdomen: Soft, appropriately tender, no distension , + BS, firm uterine fundus, the incision is clean dry and intact, staples in place  Pelvic: Normal lochia noted  Ext: No DVT signs, warm extremities, normal pulses, no CT    LABS:                        7.7    11.2  )-----------( 185      ( 2017 06:08 )             23.0                 Allergies    No Known Allergies    Intolerances      MEDICATIONS  (STANDING):  diphtheria/tetanus/pertussis (acellular) Vaccine (ADAcel) 0.5 milliLiter(s) IntraMuscular once  diphtheria/tetanus/pertussis (acellular) Vaccine (ADAcel) 0.5 milliLiter(s) IntraMuscular once  ferrous    sulfate 325 milliGRAM(s) Oral daily  lactated ringers. 1000 milliLiter(s) (125 mL/Hr) IV Continuous <Continuous>  lactated ringers. 1000 milliLiter(s) (125 mL/Hr) IV Continuous <Continuous>  lactated ringers. 1000 milliLiter(s) (125 mL/Hr) IV Continuous <Continuous>  oxytocin Infusion 41.667 milliUNIT(s)/Min (125 mL/Hr) IV Continuous <Continuous>  oxytocin Infusion 333.333 milliUNIT(s)/Min (1000 mL/Hr) IV Continuous <Continuous>  oxytocin Infusion 41.667 milliUNIT(s)/Min (125 mL/Hr) IV Continuous <Continuous>  prenatal multivitamin 1 Tablet(s) Oral daily    MEDICATIONS  (PRN):  acetaminophen   Tablet 650 milliGRAM(s) Oral every 6 hours PRN For Temp greater than 38.5 C (101.3 F)  acetaminophen  IVPB. 1000 milliGRAM(s) IV Intermittent once PRN Moderate Pain (4 - 6)  diphenhydrAMINE   Capsule 25 milliGRAM(s) Oral every 6 hours PRN Itching  diphenhydrAMINE   Capsule 25 milliGRAM(s) Oral every 4 hours PRN Pruritus  diphenhydrAMINE   Injectable 25 milliGRAM(s) IV Push once PRN Itching  docusate sodium 100 milliGRAM(s) Oral two times a day PRN Stool Softening  glycerin Suppository - Adult 1 Suppository(s) Rectal at bedtime PRN Constipation  ibuprofen  Tablet 600 milliGRAM(s) Oral every 6 hours PRN Mild pain or headache  ketorolac   Injectable 30 milliGRAM(s) IV Push every 6 hours PRN Moderate Pain (4 - 6)  ketorolac   Injectable 30 milliGRAM(s) IV Push once PRN Moderate Pain  lanolin Ointment 1 Application(s) Topical every 3 hours PRN Sore Nipples  naloxone Injectable 0.1 milliGRAM(s) IV Push every 3 minutes PRN For ANY of the following changes in patient status:  A. RR LESS THAN 10 breaths per minute, B. Oxygen saturation LESS THAN 90%, C. Sedation score of 6  ondansetron Injectable 4 milliGRAM(s) IV Push every 6 hours PRN Nausea  ondansetron Injectable 4 milliGRAM(s) IV Push once PRN Postoperative Nausea and  Vomiting  oxyCODONE    5 mG/acetaminophen 325 mG 1 Tablet(s) Oral every 3 hours PRN Moderate Pain  oxyCODONE    5 mG/acetaminophen 325 mG 2 Tablet(s) Oral every 6 hours PRN Severe Pain  simethicone 80 milliGRAM(s) Chew every 4 hours PRN Gas      RADIOLOGY & ADDITIONAL TESTS:    Assessment and Plan   Section on POD # 3 in stable condition  Continue the current pain medication  Encourage ISS & Ambulation  Encourage regular diet   DVT ppx: SCDs  Anticipate D/C home and follow up at health center  Home on PO iron
She is a  22y  who is now post-operative day 2 from [facility] for arrest of dilatation withPPH    Subjective:  The patient feels well.  She is ambulating and tolerating a diet  She is having  + flatus - bowel movements.  She reports no breathing problems, headache or visual changes  She reports normal postpartum bleeding    Vital Signs Last 24 Hrs  T(C): 37.2 (2017 20:36), Max: 37.2 (2017 20:36)  T(F): 99 (2017 20:36), Max: 99 (2017 20:36)  HR: 72 (2017 20:36) (72 - 77)  BP: 126/76 (2017 20:36) (119/82 - 126/84)  BP(mean): --  RR: 18 (2017 20:36) (18 - 18)  SpO2: --    Physical exam:  She generally looks and feels well.  No increased work of breathing.  Abdomen: Soft, nontender, no distension , firm uterine fundus, the incision is clean dry and intact.  Pelvic: Normal lochia noted.  Ext: No DVT signs, warm extremities.    LABS:                        11.5   16.8  )-----------( 219      ( 2017 02:28 )             33.5         Allergies    No Known Allergies    Intolerances
She is a  22y  who is now post-operative day 3 from [facility] for arrest of dilatation withPPH    Subjective:  Patient was seen and examined at bedside. No acute overnight events.   The patient feels well.  She is ambulating and tolerating a diet  She is having  + flatus - bowel movements.  She reports no breathing problems, headache or visual changes  She reports normal postpartum bleeding    Vital Signs Last 24 Hrs  T(C): 36.9 (2017 21:40), Max: 36.9 (2017 21:40)  T(F): 98.4 (2017 21:40), Max: 98.4 (2017 21:40)  HR: 117 (2017 21:40) (96 - 117)  BP: 138/83 (2017 21:40) (118/74 - 138/83)  RR: 18 (2017 09:07) (18 - 18)      Physical exam:  She generally looks and feels well.  No increased work of breathing.  Abdomen: Soft, nontender, no distension , firm uterine fundus, the incision is clean dry and intact.  Pelvic: Normal lochia noted.  Ext: No DVT signs, warm extremities.    LABS:                        11.5   16.8  )-----------( 219      ( 2017 02:28 )             33.5                           7.7    11.2  )-----------( 185      ( 2017 06:08 )             23.0     Allergies    No Known Allergies    Intolerances

## 2017-11-07 NOTE — PROGRESS NOTE ADULT - ASSESSMENT
Section Day: 1  Patient is feeling well overall.
 Section Day POD 2  22y yo   s/p .   She feels well.
 Section Day POD 3  22y yo   s/p .   She feels well.
21 yo now  s/p C/S for failure to progress @ 41 weeks with uterine atony, POD2.  Patient is feeling well overall.  Progressing toward discharge.
23 yo now  s/p C/S for failure to progress @ 41 weeks with uterine atony, appx 900 cc EBL, POD1. Pt doing well at this time, hemodynamically stable.

## 2017-11-07 NOTE — PROGRESS NOTE ADULT - PROBLEM SELECTOR PLAN 1
Continue the current pain medication. Encourage ambulation and regular diet. Continue DVT ppx: SCDs. Plan to discharge on day 3 or 4 according to the normal criteria.
Continue current management  Encourage PO intake and hydration  Encourage mother/baby interaction and breastfeeding  Encourage ambulation
Continue the current pain medication.  Encourage ambulation and regular diet.  DVT ppx: ambulation  She is stable, tolerates a diet  and normal bowel function.  She will be discharged on Day 3 or 4 according to the normal criteria.
Continue the current pain medication.  Encourage ambulation and regular diet.  DVT ppx: ambulation  She is stable, tolerates a diet  and normal bowel function.  She will be discharged on Day 3 or 4 according to the normal criteria.
Continue the current pain medication. Encourage ambulation and regular diet. Continue DVT ppx: SCDs. Plan to discharge on day 3 or 4 according to the normal criteria.

## 2017-11-10 LAB — SURGICAL PATHOLOGY FINAL REPORT - CH: SIGNIFICANT CHANGE UP

## 2017-12-28 ENCOUNTER — RECORD ABSTRACTING (OUTPATIENT)
Age: 22
End: 2017-12-28

## 2019-02-01 NOTE — DISCHARGE NOTE ADULT - MEDICATION SUMMARY - MEDICATIONS TO CHANGE
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

## 2020-05-27 ENCOUNTER — APPOINTMENT (OUTPATIENT)
Dept: ANTEPARTUM | Facility: CLINIC | Age: 25
End: 2020-05-27
Payer: MEDICAID

## 2020-05-27 ENCOUNTER — ASOB RESULT (OUTPATIENT)
Age: 25
End: 2020-05-27

## 2020-05-27 DIAGNOSIS — O35.1XX0 MATERNAL CARE FOR (SUSPECTED) CHROMOSOMAL ABNORMALITY IN FETUS, NOT APPLICABLE OR UNSPECIFIED: ICD-10-CM

## 2020-05-27 PROCEDURE — 36416 COLLJ CAPILLARY BLOOD SPEC: CPT

## 2020-05-27 PROCEDURE — 76801 OB US < 14 WKS SINGLE FETUS: CPT

## 2020-05-27 PROCEDURE — 76813 OB US NUCHAL MEAS 1 GEST: CPT

## 2020-05-31 LAB
1ST TRIMESTER DATA: NORMAL
ADDENDUM DOC: NORMAL
AFP PNL SERPL: NORMAL
AFP SERPL-ACNC: NORMAL
CLINICAL BIOCHEMIST REVIEW: NORMAL
FREE BETA HCG 1ST TRIMESTER: NORMAL
Lab: NORMAL
NOTES NTD: NORMAL
NT: NORMAL
PAPP-A SERPL-ACNC: NORMAL
TRISOMY 18/3: NORMAL

## 2020-07-22 ENCOUNTER — APPOINTMENT (OUTPATIENT)
Dept: ANTEPARTUM | Facility: CLINIC | Age: 25
End: 2020-07-22
Payer: MEDICAID

## 2020-07-22 ENCOUNTER — ASOB RESULT (OUTPATIENT)
Age: 25
End: 2020-07-22

## 2020-07-22 PROCEDURE — 76817 TRANSVAGINAL US OBSTETRIC: CPT

## 2020-07-22 PROCEDURE — 76811 OB US DETAILED SNGL FETUS: CPT | Mod: 59

## 2020-07-22 PROCEDURE — 36415 COLL VENOUS BLD VENIPUNCTURE: CPT

## 2020-07-22 PROCEDURE — 99213 OFFICE O/P EST LOW 20 MIN: CPT | Mod: TH

## 2020-07-27 LAB
1ST TRIMESTER DATA: NORMAL
2ND TRIMESTER DATA: NORMAL
ADDENDUM DOC: NORMAL
AFP PNL SERPL: NORMAL
AFP SERPL-ACNC: NORMAL
AFP SERPL-ACNC: NORMAL
B-HCG FREE SERPL-MCNC: NORMAL
CLINICAL BIOCHEMIST REVIEW: NORMAL
FREE BETA HCG 1ST TRIMESTER: NORMAL
INHIBIN A SERPL-MCNC: NORMAL
NOTES NTD: NORMAL
NT: NORMAL
PAPP-A SERPL-ACNC: NORMAL
U ESTRIOL SERPL-SCNC: NORMAL

## 2020-09-16 ENCOUNTER — ASOB RESULT (OUTPATIENT)
Age: 25
End: 2020-09-16

## 2020-09-16 ENCOUNTER — APPOINTMENT (OUTPATIENT)
Dept: ANTEPARTUM | Facility: CLINIC | Age: 25
End: 2020-09-16
Payer: MEDICAID

## 2020-09-16 PROCEDURE — 76816 OB US FOLLOW-UP PER FETUS: CPT

## 2020-09-16 PROCEDURE — 93976 VASCULAR STUDY: CPT

## 2020-09-16 PROCEDURE — 76820 UMBILICAL ARTERY ECHO: CPT

## 2020-10-28 ENCOUNTER — ASOB RESULT (OUTPATIENT)
Age: 25
End: 2020-10-28

## 2020-10-28 ENCOUNTER — APPOINTMENT (OUTPATIENT)
Dept: ANTEPARTUM | Facility: CLINIC | Age: 25
End: 2020-10-28
Payer: MEDICAID

## 2020-10-28 PROCEDURE — 93976 VASCULAR STUDY: CPT

## 2020-10-28 PROCEDURE — 76820 UMBILICAL ARTERY ECHO: CPT

## 2020-10-28 PROCEDURE — 76816 OB US FOLLOW-UP PER FETUS: CPT

## 2020-10-28 PROCEDURE — 99072 ADDL SUPL MATRL&STAF TM PHE: CPT

## 2020-10-28 PROCEDURE — 76819 FETAL BIOPHYS PROFIL W/O NST: CPT

## 2020-11-18 ENCOUNTER — OUTPATIENT (OUTPATIENT)
Dept: OUTPATIENT SERVICES | Facility: HOSPITAL | Age: 25
LOS: 1 days | End: 2020-11-18

## 2020-11-18 VITALS — HEIGHT: 60 IN | TEMPERATURE: 98 F | WEIGHT: 209.44 LBS

## 2020-11-18 DIAGNOSIS — Z98.891 HISTORY OF UTERINE SCAR FROM PREVIOUS SURGERY: Chronic | ICD-10-CM

## 2020-11-18 DIAGNOSIS — Z01.818 ENCOUNTER FOR OTHER PREPROCEDURAL EXAMINATION: ICD-10-CM

## 2020-11-18 LAB
BASOPHILS # BLD AUTO: 0.03 K/UL — SIGNIFICANT CHANGE UP (ref 0–0.2)
BASOPHILS NFR BLD AUTO: 0.3 % — SIGNIFICANT CHANGE UP (ref 0–2)
BLD GP AB SCN SERPL QL: SIGNIFICANT CHANGE UP
EOSINOPHIL # BLD AUTO: 0.06 K/UL — SIGNIFICANT CHANGE UP (ref 0–0.5)
EOSINOPHIL NFR BLD AUTO: 0.7 % — SIGNIFICANT CHANGE UP (ref 0–6)
HCT VFR BLD CALC: 36 % — SIGNIFICANT CHANGE UP (ref 34.5–45)
HGB BLD-MCNC: 11.4 G/DL — LOW (ref 11.5–15.5)
IMM GRANULOCYTES NFR BLD AUTO: 0.4 % — SIGNIFICANT CHANGE UP (ref 0–1.5)
LYMPHOCYTES # BLD AUTO: 2.06 K/UL — SIGNIFICANT CHANGE UP (ref 1–3.3)
LYMPHOCYTES # BLD AUTO: 22.8 % — SIGNIFICANT CHANGE UP (ref 13–44)
MCHC RBC-ENTMCNC: 23.5 PG — LOW (ref 27–34)
MCHC RBC-ENTMCNC: 31.7 GM/DL — LOW (ref 32–36)
MCV RBC AUTO: 74.2 FL — LOW (ref 80–100)
MONOCYTES # BLD AUTO: 0.4 K/UL — SIGNIFICANT CHANGE UP (ref 0–0.9)
MONOCYTES NFR BLD AUTO: 4.4 % — SIGNIFICANT CHANGE UP (ref 2–14)
NEUTROPHILS # BLD AUTO: 6.45 K/UL — SIGNIFICANT CHANGE UP (ref 1.8–7.4)
NEUTROPHILS NFR BLD AUTO: 71.4 % — SIGNIFICANT CHANGE UP (ref 43–77)
PLATELET # BLD AUTO: 328 K/UL — SIGNIFICANT CHANGE UP (ref 150–400)
RBC # BLD: 4.85 M/UL — SIGNIFICANT CHANGE UP (ref 3.8–5.2)
RBC # FLD: 16 % — HIGH (ref 10.3–14.5)
WBC # BLD: 9.04 K/UL — SIGNIFICANT CHANGE UP (ref 3.8–10.5)
WBC # FLD AUTO: 9.04 K/UL — SIGNIFICANT CHANGE UP (ref 3.8–10.5)
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
## 2020-11-18 NOTE — OB PST NOTE - PRO RUBELLA INFANT
PEDIATRIC ILLNESS VISIT   4/25/2019        Roomed by: Iman Lang MD 1:03 PM      SUBJECTIVE   accompanied by Mother  Osmin is a 5 year old male who is complaining of right ear pain, congestion.  Present for overnight and is worsening.  Present treatments include - Motrin.   Previous medical contacts for the problem - no  Symptoms:  Fever: subjective fever  Review of Systems   Constitutional: Positive for activity change.   HENT: Positive for congestion, ear pain and rhinorrhea.    Eyes: Negative.    Respiratory: Negative.    Gastrointestinal: Negative for diarrhea and vomiting.   Skin: Negative.      Allergies:  ALLERGIES:  No Known Allergies    No current outpatient medications on file.     No current facility-administered medications for this visit.        No family history on file.  No other family members have acute illness     Social history:   Attends school    OBJECTIVE:   Visit Vitals  Pulse 90   Temp 98.1 °F (36.7 °C) (Temporal)   Resp 24   Wt 16.8 kg (37 lb)     Physical Exam   Constitutional: He appears well-developed.   HENT:   Right Ear: Tympanic membrane is injected. A middle ear effusion (large amount of cerumen removed by curette. no side affects noted upon removing cerumen) is present.   Left Ear: Tympanic membrane is not injected. A middle ear effusion is present.   Nose: Nasal discharge present.   Mouth/Throat: Pharynx is abnormal.   Eyes: Conjunctivae are normal.   Neck: Normal range of motion.   Cardiovascular: Normal rate.   Pulmonary/Chest: Effort normal and breath sounds normal.   Abdominal: Soft. Bowel sounds are normal.   Neurological: He is alert.       ASSESSMENT/PLAN    Otitis Media -  prescription oral antibiotics - see orders - side effects of medication discussed, acetaminophen/ibuprofen as needed for pain and fever, recheck not needed unless symptoms persist  Upper Respiratory Infection -  push fluids, get plenty of rest, nasal saline spray/wash/gel recommended, vaporizer  recommended, nasal suction/bulb suction recommended, discussed signs and symptoms of respiratory distress and to be seen  No orders of the defined types were placed in this encounter.  Impacted cerumen... This was removed via curette, no side affects noted, discused with mom the use of debrox once weekly for maintenance.       Medication changes: Yes    Describe: The caretaker was informed of the benefits and side affects and was in agreement to start treatment.  Also it was discussed when to follow up if not improving within a certain time frame or worsening.    .    Immunizations given today? No  See Orders:  Instructed to call if the problem worsens or does not improve within the next 24 to 48 hours.    Schedule follow-up: marcelino Lang MD         immune

## 2020-11-23 ENCOUNTER — APPOINTMENT (OUTPATIENT)
Dept: ANTEPARTUM | Facility: CLINIC | Age: 25
End: 2020-11-23
Payer: MEDICAID

## 2020-11-23 ENCOUNTER — ASOB RESULT (OUTPATIENT)
Age: 25
End: 2020-11-23

## 2020-11-23 PROBLEM — D64.9 ANEMIA, UNSPECIFIED: Chronic | Status: ACTIVE | Noted: 2020-11-18

## 2020-11-23 PROBLEM — O28.0 ABNORMAL HEMATOLOGICAL FINDING ON ANTENATAL SCREENING OF MOTHER: Chronic | Status: ACTIVE | Noted: 2020-11-18

## 2020-11-23 PROCEDURE — 76816 OB US FOLLOW-UP PER FETUS: CPT

## 2020-11-23 PROCEDURE — 76819 FETAL BIOPHYS PROFIL W/O NST: CPT

## 2020-11-23 PROCEDURE — 76820 UMBILICAL ARTERY ECHO: CPT

## 2020-11-27 ENCOUNTER — OUTPATIENT (OUTPATIENT)
Dept: OUTPATIENT SERVICES | Facility: HOSPITAL | Age: 25
LOS: 1 days | End: 2020-11-27
Payer: COMMERCIAL

## 2020-11-27 DIAGNOSIS — Z11.59 ENCOUNTER FOR SCREENING FOR OTHER VIRAL DISEASES: ICD-10-CM

## 2020-11-27 DIAGNOSIS — Z98.891 HISTORY OF UTERINE SCAR FROM PREVIOUS SURGERY: Chronic | ICD-10-CM

## 2020-11-27 LAB — SARS-COV-2 RNA SPEC QL NAA+PROBE: SIGNIFICANT CHANGE UP

## 2020-11-27 PROCEDURE — U0003: CPT

## 2020-11-28 ENCOUNTER — TRANSCRIPTION ENCOUNTER (OUTPATIENT)
Age: 25
End: 2020-11-28

## 2020-11-28 RX ORDER — SODIUM CHLORIDE 9 MG/ML
1000 INJECTION, SOLUTION INTRAVENOUS
Refills: 0 | Status: DISCONTINUED | OUTPATIENT
Start: 2020-11-29 | End: 2020-11-29

## 2020-11-28 RX ORDER — OXYTOCIN 10 UNIT/ML
333.33 VIAL (ML) INJECTION
Qty: 20 | Refills: 0 | Status: DISCONTINUED | OUTPATIENT
Start: 2020-11-29 | End: 2020-12-01

## 2020-11-28 RX ORDER — METOCLOPRAMIDE HCL 10 MG
10 TABLET ORAL ONCE
Refills: 0 | Status: DISCONTINUED | OUTPATIENT
Start: 2020-11-29 | End: 2020-11-29

## 2020-11-28 NOTE — OB PROVIDER H&P - HISTORY OF PRESENT ILLNESS
at 39w by 2nd trimester ultrasound; presenting to L&D for repeat  section.     Pregnancy course:   -low TROY-A (2nd percentile)   -obesity     LMP: 20   TORI: 20     OB Hx:       G1- SAB        G2-2017 [id number] C/S   GynHx:  PMH: denies   PSH: CSx1 ()  Rx: PNV, ferrous sulfate  Allergies: NKDA   Social Hx: Denies smoking, EtOH and illicit drug use during this pregnancy.  BMI: 43     Ultrasound: vertex, anterior ()   EFW: [address] is a 25 y.o.  at 39w by 2nd trimester ultrasound presenting to L&D for repeat  section.   She reports good FM and endorses mucous-like discharge. She denies vaginal bleeding, loss of fluid, and contractions.    This pregnancy is complicated by:  -low TROY-A (2nd percentile)   -obesity   -anemia    LMP: 20   TORI: 20     OB Hx:       G1- SAB        G2-2017 [id number] pC/S for arrest of descent after IOL for post dates  GynHx: menarche age 14, regular menses, bled 3 days, no history of abnormal pap  PMH: denies   PSH: CSx1 ()  Rx: PNV, ferrous sulfate  Allergies: NKDA   Social Hx: Denies smoking, EtOH and illicit drug use during this pregnancy.  BMI: 43     Ultrasound: vertex, anterior ()   EFW: 3250g

## 2020-11-28 NOTE — OB PROVIDER H&P - NSHPPHYSICALEXAM_GEN_ALL_CORE
General: NAD, AAOx3  Resp: lungs CTA b/l  CVS: RRR  Abd: Gravid uterus, + BS Vital Signs Last 24 Hrs  HR: 72 (29 Nov 2020 08:08) (72 - 72)  BP: 119/73 (29 Nov 2020 08:08) (119/73 - 119/73)    General: NAD  Resp: CTAB  CVS: RRR  Abd: Gravid uterus, non-tender, + BS    FHT: 135 baseline, moderate variability, - accels, - decels  [place]: none

## 2020-11-28 NOTE — OB PROVIDER H&P - ASSESSMENT
20yo  at 39w admitted to L&D for repeat  section.     -Consent  -Admission labs  -IV fluids  -Continuous toco and fetal monitoring until surgery  -GBS: negative, no GBS ppx required as going for c/s  -DVT Ppx: SCDS; plan for anticoagulation post-op  -Preoperative antibiotics, 2g ancef  --section    Discussed with Dr. Crawford. 24yo  at 39w admitted to L&D for repeat  section.     -Admission labs  -IV fluids  -Continuous toco and fetal monitoring until surgery  -GBS: negative, no GBS ppx required as going for c/s  -DVT Ppx: SCDS; plan for anticoagulation post-op  -Preoperative antibiotics, 2g ancef  --section    Discussed with Dr. Crawford. 26yo  at 39w admitted to L&D for repeat  section.     -Admission labs  -IV fluids  -Continuous toco and fetal monitoring until surgery  -GBS: negative, no GBS ppx required as going for c/s  -DVT Ppx: SCDS; plan for anticoagulation post-op  -Preoperative antibiotics, 2g ancef  --section  -Male infant, does not desires cirumcision    Discussed with Dr. Crawford.

## 2020-11-28 NOTE — OB PROVIDER H&P - NS_ADMITDT_OBGYN_ALL_OB_DT
[facility] Medical [facility]itness Daily Note    Visit Number: [id number]  Initial consultation date:  April 2, 2018  Diagnosis: M.S. And Obesity  Prescription expiration date:  January 6, 2020  Last monthly update completed:  Oct. 14, 2019  Referred by: Dr. Naren Hare Jr., MD  Precautions:  Multiple Scoloris    Subjective   Clients current reports: Frustrated.  Tired  Concerns from last session: None  Update on pain/injury:  None    Therapeutic Exercise     Walked 5 min.  Exercise Sets: Reps: Weight: Position/Cues:   Box Squat with front kick 2 15 15    Single leg push up 2 20 BW    Box lunge with row 2 20 15    Walking deadlift 2 court 15                                         Comments:  Abi weighed in at 218 lbs this morning.  She is very frustrated with the weight.  She is sticking to the low carb diet.  Abi is monitoring her carb intake on the endy called carb manager.  She ate so good this weekend.  Abi is really happy and set herself up for success and went grocery shopping and is done with the no excuses.    Abi had her last horse show this past weekend.  She won both of her class performances.  She met some other riders and [facility].    Abi feels like she is tapering off with menopause. She had a couple mild hot flashes this past week, but it seems like they are calming down.    Abi is feeling less stressed and more in control.    Abi's horse show clothes are getting really tight.  Abi's last horse show is Sat. Oct. 26th and she wants to have her clothes fitting better by then.      Plan for next session     Work on form.  Sets 2-3 of 15-20 thru Nov.    Billing:    Session total time 30, number of units 1   29-Nov-2020 08:14

## 2020-11-29 ENCOUNTER — INPATIENT (INPATIENT)
Facility: HOSPITAL | Age: 25
LOS: 1 days | Discharge: HOME | End: 2020-12-01
Attending: OBSTETRICS & GYNECOLOGY | Admitting: OBSTETRICS & GYNECOLOGY
Payer: COMMERCIAL

## 2020-11-29 VITALS
WEIGHT: 220.9 LBS | SYSTOLIC BLOOD PRESSURE: 119 MMHG | HEART RATE: 72 BPM | HEIGHT: 60 IN | RESPIRATION RATE: 14 BRPM | TEMPERATURE: 98 F | DIASTOLIC BLOOD PRESSURE: 73 MMHG

## 2020-11-29 DIAGNOSIS — Z3A.39 39 WEEKS GESTATION OF PREGNANCY: ICD-10-CM

## 2020-11-29 DIAGNOSIS — D64.9 ANEMIA, UNSPECIFIED: ICD-10-CM

## 2020-11-29 DIAGNOSIS — Z98.891 HISTORY OF UTERINE SCAR FROM PREVIOUS SURGERY: Chronic | ICD-10-CM

## 2020-11-29 DIAGNOSIS — O28.0 ABNORMAL HEMATOLOGICAL FINDING ON ANTENATAL SCREENING OF MOTHER: ICD-10-CM

## 2020-11-29 DIAGNOSIS — O34.219 MATERNAL CARE FOR UNSPECIFIED TYPE SCAR FROM PREVIOUS CESAREAN DELIVERY: ICD-10-CM

## 2020-11-29 LAB
APPEARANCE UR: CLEAR — SIGNIFICANT CHANGE UP
BACTERIA # UR AUTO: NEGATIVE — SIGNIFICANT CHANGE UP
BASOPHILS # BLD AUTO: 0.04 K/UL — SIGNIFICANT CHANGE UP (ref 0–0.2)
BASOPHILS NFR BLD AUTO: 0.4 % — SIGNIFICANT CHANGE UP (ref 0–2)
BILIRUB UR-MCNC: NEGATIVE — SIGNIFICANT CHANGE UP
BLD GP AB SCN SERPL QL: SIGNIFICANT CHANGE UP
COLOR SPEC: YELLOW — SIGNIFICANT CHANGE UP
DIFF PNL FLD: NEGATIVE — SIGNIFICANT CHANGE UP
EOSINOPHIL # BLD AUTO: 0.07 K/UL — SIGNIFICANT CHANGE UP (ref 0–0.5)
EOSINOPHIL NFR BLD AUTO: 0.7 % — SIGNIFICANT CHANGE UP (ref 0–6)
EPI CELLS # UR: SIGNIFICANT CHANGE UP
GLUCOSE UR QL: NEGATIVE MG/DL — SIGNIFICANT CHANGE UP
HCT VFR BLD CALC: 35.9 % — SIGNIFICANT CHANGE UP (ref 34.5–45)
HGB BLD-MCNC: 11.3 G/DL — LOW (ref 11.5–15.5)
IMM GRANULOCYTES NFR BLD AUTO: 0.3 % — SIGNIFICANT CHANGE UP (ref 0–1.5)
KETONES UR-MCNC: NEGATIVE — SIGNIFICANT CHANGE UP
LEUKOCYTE ESTERASE UR-ACNC: ABNORMAL
LYMPHOCYTES # BLD AUTO: 2.65 K/UL — SIGNIFICANT CHANGE UP (ref 1–3.3)
LYMPHOCYTES # BLD AUTO: 27.9 % — SIGNIFICANT CHANGE UP (ref 13–44)
MCHC RBC-ENTMCNC: 23.6 PG — LOW (ref 27–34)
MCHC RBC-ENTMCNC: 31.5 GM/DL — LOW (ref 32–36)
MCV RBC AUTO: 75.1 FL — LOW (ref 80–100)
MONOCYTES # BLD AUTO: 0.46 K/UL — SIGNIFICANT CHANGE UP (ref 0–0.9)
MONOCYTES NFR BLD AUTO: 4.8 % — SIGNIFICANT CHANGE UP (ref 2–14)
NEUTROPHILS # BLD AUTO: 6.26 K/UL — SIGNIFICANT CHANGE UP (ref 1.8–7.4)
NEUTROPHILS NFR BLD AUTO: 65.9 % — SIGNIFICANT CHANGE UP (ref 43–77)
NITRITE UR-MCNC: NEGATIVE — SIGNIFICANT CHANGE UP
PH UR: 6.5 — SIGNIFICANT CHANGE UP (ref 5–8)
PLATELET # BLD AUTO: 287 K/UL — SIGNIFICANT CHANGE UP (ref 150–400)
PROT UR-MCNC: 15 MG/DL
RBC # BLD: 4.78 M/UL — SIGNIFICANT CHANGE UP (ref 3.8–5.2)
RBC # FLD: 16.5 % — HIGH (ref 10.3–14.5)
RBC CASTS # UR COMP ASSIST: SIGNIFICANT CHANGE UP /HPF (ref 0–4)
SARS-COV-2 IGG SERPL QL IA: POSITIVE
SARS-COV-2 IGM SERPL IA-ACNC: 6.36 INDEX — HIGH
SP GR SPEC: 1.01 — SIGNIFICANT CHANGE UP (ref 1.01–1.02)
UROBILINOGEN FLD QL: NEGATIVE MG/DL — SIGNIFICANT CHANGE UP
WBC # BLD: 9.51 K/UL — SIGNIFICANT CHANGE UP (ref 3.8–10.5)
WBC # FLD AUTO: 9.51 K/UL — SIGNIFICANT CHANGE UP (ref 3.8–10.5)
WBC UR QL: SIGNIFICANT CHANGE UP

## 2020-11-29 RX ORDER — ENOXAPARIN SODIUM 100 MG/ML
40 INJECTION SUBCUTANEOUS EVERY 24 HOURS
Refills: 0 | Status: DISCONTINUED | OUTPATIENT
Start: 2020-11-29 | End: 2020-12-01

## 2020-11-29 RX ORDER — FAMOTIDINE 10 MG/ML
20 INJECTION INTRAVENOUS ONCE
Refills: 0 | Status: COMPLETED | OUTPATIENT
Start: 2020-11-29 | End: 2020-11-29

## 2020-11-29 RX ORDER — DIPHENHYDRAMINE HCL 50 MG
25 CAPSULE ORAL EVERY 6 HOURS
Refills: 0 | Status: DISCONTINUED | OUTPATIENT
Start: 2020-11-29 | End: 2020-12-01

## 2020-11-29 RX ORDER — SODIUM CHLORIDE 9 MG/ML
1000 INJECTION, SOLUTION INTRAVENOUS ONCE
Refills: 0 | Status: COMPLETED | OUTPATIENT
Start: 2020-11-29 | End: 2020-11-29

## 2020-11-29 RX ORDER — NALOXONE HYDROCHLORIDE 4 MG/.1ML
0.1 SPRAY NASAL
Refills: 0 | Status: DISCONTINUED | OUTPATIENT
Start: 2020-11-29 | End: 2020-12-01

## 2020-11-29 RX ORDER — ACETAMINOPHEN 500 MG
975 TABLET ORAL
Refills: 0 | Status: DISCONTINUED | OUTPATIENT
Start: 2020-11-29 | End: 2020-12-01

## 2020-11-29 RX ORDER — SIMETHICONE 80 MG/1
80 TABLET, CHEWABLE ORAL EVERY 4 HOURS
Refills: 0 | Status: DISCONTINUED | OUTPATIENT
Start: 2020-11-29 | End: 2020-12-01

## 2020-11-29 RX ORDER — KETOROLAC TROMETHAMINE 30 MG/ML
30 SYRINGE (ML) INJECTION EVERY 6 HOURS
Refills: 0 | Status: DISCONTINUED | OUTPATIENT
Start: 2020-11-29 | End: 2020-11-30

## 2020-11-29 RX ORDER — SODIUM CHLORIDE 9 MG/ML
1000 INJECTION, SOLUTION INTRAVENOUS
Refills: 0 | Status: DISCONTINUED | OUTPATIENT
Start: 2020-11-29 | End: 2020-12-01

## 2020-11-29 RX ORDER — MAGNESIUM HYDROXIDE 400 MG/1
30 TABLET, CHEWABLE ORAL
Refills: 0 | Status: DISCONTINUED | OUTPATIENT
Start: 2020-11-29 | End: 2020-12-01

## 2020-11-29 RX ORDER — LANOLIN
1 OINTMENT (GRAM) TOPICAL EVERY 6 HOURS
Refills: 0 | Status: DISCONTINUED | OUTPATIENT
Start: 2020-11-29 | End: 2020-12-01

## 2020-11-29 RX ORDER — ACETAMINOPHEN 500 MG
1000 TABLET ORAL ONCE
Refills: 0 | Status: DISCONTINUED | OUTPATIENT
Start: 2020-11-29 | End: 2020-12-01

## 2020-11-29 RX ORDER — IBUPROFEN 200 MG
600 TABLET ORAL EVERY 6 HOURS
Refills: 0 | Status: COMPLETED | OUTPATIENT
Start: 2020-11-29 | End: 2021-10-28

## 2020-11-29 RX ORDER — CITRIC ACID/SODIUM CITRATE 300-500 MG
30 SOLUTION, ORAL ORAL ONCE
Refills: 0 | Status: COMPLETED | OUTPATIENT
Start: 2020-11-29 | End: 2020-11-29

## 2020-11-29 RX ORDER — OXYCODONE HYDROCHLORIDE 5 MG/1
5 TABLET ORAL ONCE
Refills: 0 | Status: DISCONTINUED | OUTPATIENT
Start: 2020-11-29 | End: 2020-12-01

## 2020-11-29 RX ORDER — OXYTOCIN 10 UNIT/ML
333.33 VIAL (ML) INJECTION
Qty: 20 | Refills: 0 | Status: DISCONTINUED | OUTPATIENT
Start: 2020-11-29 | End: 2020-12-01

## 2020-11-29 RX ORDER — NALBUPHINE HYDROCHLORIDE 10 MG/ML
2.5 INJECTION, SOLUTION INTRAMUSCULAR; INTRAVENOUS; SUBCUTANEOUS EVERY 4 HOURS
Refills: 0 | Status: DISCONTINUED | OUTPATIENT
Start: 2020-11-29 | End: 2020-12-01

## 2020-11-29 RX ORDER — CEFAZOLIN SODIUM 1 G
2000 VIAL (EA) INJECTION ONCE
Refills: 0 | Status: COMPLETED | OUTPATIENT
Start: 2020-11-29 | End: 2020-11-29

## 2020-11-29 RX ORDER — OXYCODONE HYDROCHLORIDE 5 MG/1
5 TABLET ORAL
Refills: 0 | Status: DISCONTINUED | OUTPATIENT
Start: 2020-11-29 | End: 2020-12-01

## 2020-11-29 RX ORDER — KETOROLAC TROMETHAMINE 30 MG/ML
30 SYRINGE (ML) INJECTION EVERY 6 HOURS
Refills: 0 | Status: DISCONTINUED | OUTPATIENT
Start: 2020-11-29 | End: 2020-12-01

## 2020-11-29 RX ORDER — DIPHENHYDRAMINE HCL 50 MG
25 CAPSULE ORAL EVERY 4 HOURS
Refills: 0 | Status: DISCONTINUED | OUTPATIENT
Start: 2020-11-29 | End: 2020-12-01

## 2020-11-29 RX ORDER — TETANUS TOXOID, REDUCED DIPHTHERIA TOXOID AND ACELLULAR PERTUSSIS VACCINE, ADSORBED 5; 2.5; 8; 8; 2.5 [IU]/.5ML; [IU]/.5ML; UG/.5ML; UG/.5ML; UG/.5ML
0.5 SUSPENSION INTRAMUSCULAR ONCE
Refills: 0 | Status: DISCONTINUED | OUTPATIENT
Start: 2020-11-29 | End: 2020-12-01

## 2020-11-29 RX ORDER — ONDANSETRON 8 MG/1
4 TABLET, FILM COATED ORAL EVERY 4 HOURS
Refills: 0 | Status: DISCONTINUED | OUTPATIENT
Start: 2020-11-29 | End: 2020-12-01

## 2020-11-29 RX ADMIN — SODIUM CHLORIDE 125 MILLILITER(S): 9 INJECTION, SOLUTION INTRAVENOUS at 08:53

## 2020-11-29 RX ADMIN — Medication 30 MILLILITER(S): at 08:51

## 2020-11-29 RX ADMIN — Medication 975 MILLIGRAM(S): at 15:33

## 2020-11-29 RX ADMIN — Medication 30 MILLIGRAM(S): at 23:14

## 2020-11-29 RX ADMIN — Medication 100 MILLIGRAM(S): at 09:14

## 2020-11-29 RX ADMIN — FAMOTIDINE 20 MILLIGRAM(S): 10 INJECTION INTRAVENOUS at 08:51

## 2020-11-29 RX ADMIN — ENOXAPARIN SODIUM 40 MILLIGRAM(S): 100 INJECTION SUBCUTANEOUS at 22:53

## 2020-11-29 RX ADMIN — Medication 30 MILLIGRAM(S): at 23:31

## 2020-11-29 RX ADMIN — Medication 1000 MILLIUNIT(S)/MIN: at 10:26

## 2020-11-29 RX ADMIN — Medication 30 MILLIGRAM(S): at 17:54

## 2020-11-29 RX ADMIN — SODIUM CHLORIDE 2000 MILLILITER(S): 9 INJECTION, SOLUTION INTRAVENOUS at 08:15

## 2020-11-29 RX ADMIN — Medication 975 MILLIGRAM(S): at 16:03

## 2020-11-29 RX ADMIN — Medication 30 MILLIGRAM(S): at 11:48

## 2020-11-29 RX ADMIN — Medication 30 MILLIGRAM(S): at 18:24

## 2020-11-29 RX ADMIN — Medication 30 MILLIGRAM(S): at 11:45

## 2020-11-29 NOTE — OB RN DELIVERY SUMMARY - NS_SEPSISRSKCALC_OBGYN_ALL_OB_FT
EOS calculated successfully. EOS Risk Factor: 0.5/1000 live births ([facility] national incidence); GA=39w;Temp=98.2; ROM=0.033; GBS='Unknown'; Antibiotics='No antibiotics or any antibiotics < 2 hrs prior to birth'

## 2020-11-29 NOTE — OB PROVIDER DELIVERY SUMMARY - NSPROVIDERDELIVERYNOTE_OBGYN_ALL_OB_FT
Patient was taken to the OR, a repeat low transverse  section was performed and a viable male infant was delivered atraumatically in ROT presentation at 0955, nuchal and body cordx1. Placenta delivered at 0956. Hysterotomy, rectus muscles, fascia, subcutaneous and skin were reapproximated with multiple sutures. Excellent hemostasis was obtained at each layer of closure. Subcutaneous nodule found on left corner of fascia was removed. Abdominal wall adhesions noted.   Apgars 8,9. Weight 3040g.   QBL 825cc. Patient was taken to the OR, a repeat low transverse  section was performed, subcutaneous fibrotic encapsulated tissue encountered that was removed and sent to pathology. A viable male infant was delivered atraumatically with some difficulty in ROT presentation at 0955, nuchal and body cordx1. Placenta delivered at 0956. Hysterotomy closed in two layers with good hemostasis. SNOW placed for minor oozing from lower segment.  SNOW placed on top of muscle for hemostasis after figure of eight was done on muscle. Rectus muscles, fascia, subcutaneous and skin were reapproximated with multiple sutures. Excellent hemostasis was obtained at each layer of closure. Subcutaneous nodule found on left corner of fascia was removed.    Apgars 8,9. Weight 3040g.   QBL 825cc.  ppalos

## 2020-11-29 NOTE — OB PROVIDER DELIVERY SUMMARY - AMNIOTIC FLUID AMOUNT, LABOR
Mononucleosis  Mononucleosis, also known as “mono,” is usually caused by a germ called the Delia-Barr virus. Though best known for causing swollen glands and fatigue, mononucleosis can take many forms. Most children with mono recover without any problems. But the illness can take a long time to go away. In some cases, mono can cause problems with the liver, spleen, or heart. So it is important that mono be diagnosed and the child watched carefully.  Causes of mononucleosis  Mono can be easily transmitted from an infected person's saliva by:  · Drinking and eating after them  · Sharing a straw, cup, toothbrushes, and eating utensils  · Kissing and close contact  · Handling toys with children drool  Symptoms of mononucleosis     The best treatment for mono is plenty of rest.   In children, common symptoms include:  · Tiredness, weakness  · Fever  · Sore throat  · Tender or swollen lymph nodes in the neck or armpits  · Swollen tonsils  · Rash  · Sore muscles or stiffness  · Headache  · Loss of appetite, nausea  · Dull pain in the stomach area  · Enlarged liver and spleen  · Headaches  · Puffy eyes  · Sensitivity to light  Treating mono  Because it is a viral infection, antibiotics won’t cure mono. Your child's health care provider may prescribe medications to help ease your child's pain or discomfort. The best treatment for mono is rest. A child with mono should also drink lots of fluids. To help your child feel better and recover sooner:  · Make sure the child gets plenty of rest.  · Keep the child warm.  · Feed the child plenty of  fluids, such as water or apple juice.  · Do not let anyone smoke around the child.  · Make sure your child avoids contact sports, and heavy lifting for a month to prevent injury to the spleen. The spleen can become enlarged during this illness. Discuss with your child's health care provider when he or she can return to normal activities.  · Treat the child’s fever, sore throat, or  aching muscles with children’s acetaminophen or ibuprofen. Never give your child aspirin.  Symptoms usually last for a few weeks, but can sometimes last for 1 to 2 months or longer. Even after symptoms have gone away, your child may be tired or weak for some time.  Preventing the spread of mono  While you’re caring for a child with mono:  · Wash your hands with warm water and soap often, especially before and after tending to your sick child.  · Monitor your own health and that of other family members  · Limit a sick child’s contact with other children.  · Clean dishes and eating utensils used by a sick child separately in very hot, soapy water. Or run them through the .  When to seek medical care  Call the health care provider right away if your otherwise healthy child:  · Is an infant under 3 months old with a rectal temperature of 100.4°F (38°C) or higher  · Is a child of any age who has a repeated temperature of 104°F (40°C) or higher  · Has a fever that lasts more than 24-hours in a child under 2 years old, or for 3 days in a child 2 years or older  · Has had a seizure caused by the fever  · Experiences difficult or rapid breathing.  · Cannot be soothed or shows signs of irritability or restlessness.  · Seems unusually drowsy, listless, or unresponsive.  · Has trouble eating, drinking, or swallowing.  · Stops breathing, even for an instant.  · Shows signs of severe chest, neck, or abdominal pain.  © 8344-8617 [employer]. [address] [place] [address], [place] [address]. All rights reserved. This information is not intended as a substitute for professional medical care. Always follow your healthcare professional's instructions.         within normal limits

## 2020-11-29 NOTE — OB NEONATOLOGY/PEDIATRICIAN DELIVERY SUMMARY - NSPEDSNEONOTESA_OBGYN_ALL_OB_FT
Dr. Crawford requested me to attend R C/S at 39 weeks. The mother is 25 y/o, , B+, RI, GBS, HIV, RPR, & HBsAg are NR. She is on baby Aspirin due to low Erin-a   L & D: Clear fluid, suctioned and dried, APGAR 9 & 9  Asst: full term appropriate for gestational age, BB, R C/S  Plan: observe in transition, if stable admit to NBN. Dr. Crawford requested me to attend R C/S at 39 weeks. The mother is 23 y/o, , B+, RI, GBS, HIV, RPR, & HBsAg are NR. She is on baby Aspirin due to low Erin-a   L & D: Clear fluid, suctioned and dried, APGAR 9 & 9  Asst: full term appropriate for gestational age, BB, R C/S  Plan: observe in transition, if stable admit to NBN Dr. Crawford requested me to attend R C/S at 39 weeks. The mother is 23 y/o, , B+, RI, GBS, HIV, RPR, & HBsAg are NR. She is on baby Aspirin due to low Erin-a   L & D: Clear fluid, suctioned and dried, APGAR 8 & 9, BW: 3040 GM  small cut ,0.5 cm over right cheek, and point abrasion over left eye lid. These were cleaned, and sterry strip placed on the cheek  Asst: full term appropriate for gestational age, BB, R C/S  Plan: observe in transition, if stable admit to NBN

## 2020-11-30 ENCOUNTER — TRANSCRIPTION ENCOUNTER (OUTPATIENT)
Age: 25
End: 2020-11-30

## 2020-11-30 ENCOUNTER — RESULT REVIEW (OUTPATIENT)
Age: 25
End: 2020-11-30

## 2020-11-30 DIAGNOSIS — D50.0 IRON DEFICIENCY ANEMIA SECONDARY TO BLOOD LOSS (CHRONIC): ICD-10-CM

## 2020-11-30 LAB
BASOPHILS # BLD AUTO: 0.01 K/UL — SIGNIFICANT CHANGE UP (ref 0–0.2)
BASOPHILS NFR BLD AUTO: 0.1 % — SIGNIFICANT CHANGE UP (ref 0–2)
EOSINOPHIL # BLD AUTO: 0.01 K/UL — SIGNIFICANT CHANGE UP (ref 0–0.5)
EOSINOPHIL NFR BLD AUTO: 0.1 % — SIGNIFICANT CHANGE UP (ref 0–6)
HCT VFR BLD CALC: 24.6 % — LOW (ref 34.5–45)
HGB BLD-MCNC: 8 G/DL — LOW (ref 11.5–15.5)
IMM GRANULOCYTES NFR BLD AUTO: 0.4 % — SIGNIFICANT CHANGE UP (ref 0–1.5)
LYMPHOCYTES # BLD AUTO: 2.71 K/UL — SIGNIFICANT CHANGE UP (ref 1–3.3)
LYMPHOCYTES # BLD AUTO: 28 % — SIGNIFICANT CHANGE UP (ref 13–44)
MCHC RBC-ENTMCNC: 24.2 PG — LOW (ref 27–34)
MCHC RBC-ENTMCNC: 32.5 GM/DL — SIGNIFICANT CHANGE UP (ref 32–36)
MCV RBC AUTO: 74.5 FL — LOW (ref 80–100)
MONOCYTES # BLD AUTO: 0.56 K/UL — SIGNIFICANT CHANGE UP (ref 0–0.9)
MONOCYTES NFR BLD AUTO: 5.8 % — SIGNIFICANT CHANGE UP (ref 2–14)
NEUTROPHILS # BLD AUTO: 6.36 K/UL — SIGNIFICANT CHANGE UP (ref 1.8–7.4)
NEUTROPHILS NFR BLD AUTO: 65.6 % — SIGNIFICANT CHANGE UP (ref 43–77)
PLATELET # BLD AUTO: 219 K/UL — SIGNIFICANT CHANGE UP (ref 150–400)
RBC # BLD: 3.3 M/UL — LOW (ref 3.8–5.2)
RBC # FLD: 16.3 % — HIGH (ref 10.3–14.5)
T PALLIDUM AB TITR SER: NEGATIVE — SIGNIFICANT CHANGE UP
WBC # BLD: 9.69 K/UL — SIGNIFICANT CHANGE UP (ref 3.8–10.5)
WBC # FLD AUTO: 9.69 K/UL — SIGNIFICANT CHANGE UP (ref 3.8–10.5)

## 2020-11-30 PROCEDURE — 88305 TISSUE EXAM BY PATHOLOGIST: CPT | Mod: 26

## 2020-11-30 RX ORDER — IBUPROFEN 200 MG
600 TABLET ORAL EVERY 6 HOURS
Refills: 0 | Status: DISCONTINUED | OUTPATIENT
Start: 2020-11-30 | End: 2020-12-01

## 2020-11-30 RX ORDER — FERROUS SULFATE 325(65) MG
325 TABLET ORAL DAILY
Refills: 0 | Status: DISCONTINUED | OUTPATIENT
Start: 2020-11-30 | End: 2020-12-01

## 2020-11-30 RX ADMIN — Medication 600 MILLIGRAM(S): at 11:25

## 2020-11-30 RX ADMIN — Medication 975 MILLIGRAM(S): at 09:06

## 2020-11-30 RX ADMIN — Medication 30 MILLIGRAM(S): at 05:34

## 2020-11-30 RX ADMIN — Medication 30 MILLIGRAM(S): at 05:49

## 2020-11-30 RX ADMIN — Medication 600 MILLIGRAM(S): at 19:14

## 2020-11-30 RX ADMIN — Medication 975 MILLIGRAM(S): at 20:19

## 2020-11-30 RX ADMIN — Medication 600 MILLIGRAM(S): at 17:17

## 2020-11-30 RX ADMIN — Medication 975 MILLIGRAM(S): at 09:30

## 2020-11-30 RX ADMIN — Medication 975 MILLIGRAM(S): at 21:19

## 2020-11-30 RX ADMIN — Medication 600 MILLIGRAM(S): at 12:00

## 2020-11-30 RX ADMIN — Medication 325 MILLIGRAM(S): at 11:25

## 2020-11-30 NOTE — PROGRESS NOTE ADULT - ASSESSMENT
KALEB SMITH is a 25y  s/p uncomplicated repeat  section POD #1. Viable male infant.  - Pain controlled on current medications  - Tolerating po,   - + flatus  - + void  - hgb 11.4   -->  8.0 (will start po iron)  -  Lovenox for DVT prophylaxis   - Rh+  - Pt with male infant; declines circumcision  - Dispo: Continue post op care

## 2020-12-01 VITALS
OXYGEN SATURATION: 99 % | DIASTOLIC BLOOD PRESSURE: 61 MMHG | TEMPERATURE: 99 F | RESPIRATION RATE: 18 BRPM | HEART RATE: 93 BPM | SYSTOLIC BLOOD PRESSURE: 96 MMHG

## 2020-12-01 LAB
HCT VFR BLD CALC: 26.8 % — LOW (ref 34.5–45)
HGB BLD-MCNC: 8.2 G/DL — LOW (ref 11.5–15.5)
MCHC RBC-ENTMCNC: 23.5 PG — LOW (ref 27–34)
MCHC RBC-ENTMCNC: 30.6 GM/DL — LOW (ref 32–36)
MCV RBC AUTO: 76.8 FL — LOW (ref 80–100)
PLATELET # BLD AUTO: 245 K/UL — SIGNIFICANT CHANGE UP (ref 150–400)
RBC # BLD: 3.49 M/UL — LOW (ref 3.8–5.2)
RBC # FLD: 17.2 % — HIGH (ref 10.3–14.5)
WBC # BLD: 8.94 K/UL — SIGNIFICANT CHANGE UP (ref 3.8–10.5)
WBC # FLD AUTO: 8.94 K/UL — SIGNIFICANT CHANGE UP (ref 3.8–10.5)

## 2020-12-01 PROCEDURE — 81001 URINALYSIS AUTO W/SCOPE: CPT

## 2020-12-01 PROCEDURE — 36415 COLL VENOUS BLD VENIPUNCTURE: CPT

## 2020-12-01 PROCEDURE — 85025 COMPLETE CBC W/AUTO DIFF WBC: CPT

## 2020-12-01 PROCEDURE — 86769 SARS-COV-2 COVID-19 ANTIBODY: CPT

## 2020-12-01 PROCEDURE — 86901 BLOOD TYPING SEROLOGIC RH(D): CPT

## 2020-12-01 PROCEDURE — 86850 RBC ANTIBODY SCREEN: CPT

## 2020-12-01 PROCEDURE — 86780 TREPONEMA PALLIDUM: CPT

## 2020-12-01 PROCEDURE — 88305 TISSUE EXAM BY PATHOLOGIST: CPT

## 2020-12-01 PROCEDURE — 86900 BLOOD TYPING SEROLOGIC ABO: CPT

## 2020-12-01 PROCEDURE — 85027 COMPLETE CBC AUTOMATED: CPT

## 2020-12-01 RX ORDER — ACETAMINOPHEN 500 MG
2 TABLET ORAL
Qty: 112 | Refills: 0
Start: 2020-12-01 | End: 2020-12-14

## 2020-12-01 RX ORDER — IBUPROFEN 200 MG
1 TABLET ORAL
Qty: 56 | Refills: 0
Start: 2020-12-01 | End: 2020-12-14

## 2020-12-01 RX ORDER — FERROUS SULFATE 325(65) MG
1 TABLET ORAL
Qty: 30 | Refills: 1
Start: 2020-12-01 | End: 2021-01-29

## 2020-12-01 RX ORDER — ASPIRIN/CALCIUM CARB/MAGNESIUM 324 MG
0 TABLET ORAL
Qty: 0 | Refills: 0 | DISCHARGE

## 2020-12-01 RX ORDER — SENNA PLUS 8.6 MG/1
2 TABLET ORAL
Qty: 60 | Refills: 0
Start: 2020-12-01 | End: 2020-12-30

## 2020-12-01 RX ADMIN — Medication 975 MILLIGRAM(S): at 09:45

## 2020-12-01 RX ADMIN — Medication 975 MILLIGRAM(S): at 08:56

## 2020-12-01 RX ADMIN — Medication 600 MILLIGRAM(S): at 11:31

## 2020-12-01 RX ADMIN — ENOXAPARIN SODIUM 40 MILLIGRAM(S): 100 INJECTION SUBCUTANEOUS at 00:10

## 2020-12-01 RX ADMIN — Medication 600 MILLIGRAM(S): at 01:09

## 2020-12-01 RX ADMIN — Medication 325 MILLIGRAM(S): at 11:32

## 2020-12-01 RX ADMIN — Medication 600 MILLIGRAM(S): at 00:09

## 2020-12-01 NOTE — PROGRESS NOTE ADULT - ASSESSMENT
KALEB SMITH is a 25y  s/p uncomplicated repeat  section POD #2. Viable male infant.  - Pain controlled on current medications  - Tolerating po   - + flatus  - + void  - hgb 11.4   -->  8.0 (started on PO iron)  - Lovenox for DVT prophylaxis   - Rh+  - Pt with male infant; declines circumcision  - Dispo: Home pending attending approval KALEB SMITH is a 25y  s/p uncomplicated repeat  section POD #2. Viable male infant.  - Pain controlled on current medications  - Tolerating po   - + flatus  - + void  - hgb 11.4   -->  8.0 (started on PO iron), f/u AM cbc this am  - Lovenox for DVT prophylaxis   - Rh+  - Pt with male infant; declines circumcision  - Dispo: Home pending attending approval and cbc

## 2020-12-01 NOTE — DISCHARGE NOTE OB - CARE PLAN
Principal Discharge DX:	 delivery delivered  Goal:	Rapid recovery  Assessment and plan of treatment:	1) Please take ibuprofen and/or tylenol as needed for pain as prescribed.  2) Nothing in the vagina for 6 weeks (including no sex, no tampons, and no douching).  3) Please call your doctor for a follow up your postpartum appointment in 2 weeks.  4) Please continue taking vitamins postpartum. Take iron and colace for acute blood loss anemia.  5) Please call the office sooner if you have heavy vaginal bleeding, severe abdominal pain, or fever > 100.4F.  6) You may resume regular daily activity as tolerated

## 2020-12-01 NOTE — DISCHARGE NOTE OB - MEDICATION SUMMARY - MEDICATIONS TO STOP TAKING
I will STOP taking the medications listed below when I get home from the hospital:    ibuprofen 600 mg oral tablet  -- 1 tab(s) by mouth every 6 hours, As needed, Mild pain or headache

## 2020-12-01 NOTE — DISCHARGE NOTE OB - PLAN OF CARE
Rapid recovery 1) Please take ibuprofen and/or tylenol as needed for pain as prescribed.  2) Nothing in the vagina for 6 weeks (including no sex, no tampons, and no douching).  3) Please call your doctor for a follow up your postpartum appointment in 2 weeks.  4) Please continue taking vitamins postpartum. Take iron and colace for acute blood loss anemia.  5) Please call the office sooner if you have heavy vaginal bleeding, severe abdominal pain, or fever > 100.4F.  6) You may resume regular daily activity as tolerated

## 2020-12-01 NOTE — PROGRESS NOTE ADULT - ATTENDING COMMENTS
Post  day # 1  no complaints  exam wnl    Plan  cbc  other routine care  discussed contraception, vaccination, breastfeeding
post  day # 2  no complaints  exam wnl  labs reviewed  Hb 8    cleared for dc on iron  discussed contraception, vaccination, breastfeeding  follow up for wound check and post partum visit

## 2020-12-01 NOTE — DISCHARGE NOTE OB - PATIENT PORTAL LINK FT
You can access the FollowMyHealth Patient Portal offered by [facility] by registering at the following website: [email or web address]. By joining [email or web address]’s FollowMyHealth portal, you will also be able to view your health information using other applications (apps) compatible with our system.

## 2020-12-01 NOTE — DISCHARGE NOTE OB - CARE PROVIDER_API CALL
Natacha Crawford E  OBSTETRICS AND GYNECOLOGY  [facility] [place] [address]  Phone: [phone]  Fax: [phone]  Follow Up Time:

## 2020-12-01 NOTE — DISCHARGE NOTE OB - MEDICATION SUMMARY - MEDICATIONS TO TAKE
I will START or STAY ON the medications listed below when I get home from the hospital:    acetaminophen 325 mg oral tablet  -- 2 tab(s) by mouth every 6 hours, As Needed -for mild pain - for moderate pain   -- This product contains acetaminophen.  Do not use  with any other product containing acetaminophen to prevent possible liver damage.    -- Indication: For Pain    ibuprofen 600 mg oral tablet  -- 1 tab(s) by mouth every 6 hours, As Needed -for mild pain - for moderate pain   -- Do not take this drug if you are pregnant.  It is very important that you take or use this exactly as directed.  Do not skip doses or discontinue unless directed by your doctor.  May cause drowsiness or dizziness.  Obtain medical advice before taking any non-prescription drugs as some may affect the action of this medication.  Take with food or milk.    -- Indication: For Pain    ferrous sulfate 325 mg (65 mg elemental iron) oral tablet  -- 1 tab(s) by mouth once a day   -- Check with your doctor before becoming pregnant.  Do not chew, break, or crush.  May discolor urine or feces.    -- Indication: For Anemia due to blood loss    senna oral tablet  -- 2 tab(s) by mouth once a day   -- Indication: For Constipation

## 2020-12-02 LAB — SURGICAL PATHOLOGY STUDY: SIGNIFICANT CHANGE UP

## 2021-10-21 NOTE — OB RN PREOPERATIVE CHECKLIST - STERILIZATION AFFIRMATION
He has a mild EKG abnormality.  The remainder of his cardiac evaluation is normal.  I believe the EKG finding can be considered a normal variant and should not be cause for concern.  
n/a

## 2023-07-20 NOTE — PROGRESS NOTE ADULT - SUBJECTIVE AND OBJECTIVE BOX
General
Name: KALEB SMITH  MRN: [id number]  Date Admitted: 20  Location: [facility] 2EST 2007 ([facility] 2EST)  Attending: Natacha Crawford      Post Partum Note:     KALEB SMITH is a 25y  s/p uncomplicated repeat  section POD #2. Viable male infant.    SUBJECTIVE:  No acute events overnight. Pain is well controlled with PRN pain medication. No problems with ambulating, voiding, or PO intake. Has had flatus and BM. Denies N/V. Patient is having normal lochia which is decreasing.    She is bottle and breastfeeding and the baby is latching on.     OBJECTIVE:    Vital Signs Last 24 Hrs  T(C): 37.1 (01 Dec 2020 03:31), Max: 37.1 (01 Dec 2020 03:31)  T(F): 98.8 (01 Dec 2020 03:31), Max: 98.8 (01 Dec 2020 03:31)  HR: 93 (01 Dec 2020 03:31) (74 - 93)  BP: 96/61 (01 Dec 2020 03:31) (96/61 - 114/70)  RR: 18 (01 Dec 2020 03:31) (14 - 18)  SpO2: 99% (01 Dec 2020 03:31) (99% - 100%)    Physical exam:  General: AOx3, NAD.  Heart: RRR. S1S2.  Lungs: CTABL. Good airflow bilaterally.   Abdomen: +BS, Soft, appropriately tender, nondistended, no guarding or rebound tenderness, firm uterine fundus at umbilicus, the incision is clean dry and intact with steri-strips. No erythema or discharge.  Ext: No DVT signs, warm extremities.        LABS:                        8.0    9.69  )-----------( 219      ( 2020 06:12 )             24.6           
Name: KALEB SMITH  MRN: [id number]  Date Admitted: 20  Location: [facility] 2EST 2007 ([facility] 2EST)  Attending: Natacha Crawford      Post Partum Note:     KALEB SMITH is a 25y  s/p uncomplicated repeat  section POD #1. Viable male infant.    SUBJECTIVE:  No acute events overnight. Pain is well controlled with PRN pain medication. No problems with ambulating, voiding, or PO intake. Has had flatus but no BM. Denies N/V. Patient is having normal lochia which is decreasing.    She is breastfeeding and the baby is latching on.     OBJECTIVE:    Vital Signs Last 24 Hrs  T(C): 36.7 (2020 03:40), Max: 37.2 (2020 23:15)  T(F): 98.1 (2020 03:40), Max: 98.9 (2020 23:15)  HR: 72 (2020 03:40) (60 - 87)  BP: 97/67 (2020 03:40) (93/48 - 149/79)  RR: 18 (2020 03:40) (14 - 18)  SpO2: 99% (2020 03:40) (89% - 100%)    Physical exam:  General: AOx3, NAD.  Heart: RRR. S1S2.  Lungs: CTABL. Good airflow bilaterally.   Abdomen: +BS, Soft, appropriately tender, nondistended, no guarding or rebound tenderness, firm uterine fundus at umbilicus, the incision is clean dry and intact with steri-strip. No erythema or discharge.  Ext: No DVT signs, warm extremities.        LABS:                        8.0    9.69  )-----------( 219      ( 2020 06:12 )             24.6

## 2023-11-20 ENCOUNTER — APPOINTMENT (OUTPATIENT)
Dept: ANTEPARTUM | Facility: CLINIC | Age: 28
End: 2023-11-20
Payer: COMMERCIAL

## 2023-11-20 ENCOUNTER — ASOB RESULT (OUTPATIENT)
Age: 28
End: 2023-11-20

## 2023-11-20 PROCEDURE — 76801 OB US < 14 WKS SINGLE FETUS: CPT

## 2023-11-20 PROCEDURE — 76817 TRANSVAGINAL US OBSTETRIC: CPT | Mod: 59

## 2023-12-28 NOTE — OB PROVIDER H&P - ATTENDING COMMENTS
Problem: Adult Inpatient Plan of Care  Goal: Absence of Hospital-Acquired Illness or Injury  Intervention: Identify and Manage Fall Risk  Recent Flowsheet Documentation  Taken 12/28/2023 0200 by Patrice Barragan RN  Safety Promotion/Fall Prevention:   safety round/check completed   room organization consistent   nonskid shoes/slippers when out of bed   fall prevention program maintained   assistive device/personal items within reach   clutter free environment maintained  Taken 12/28/2023 0000 by Patrice Barragan RN  Safety Promotion/Fall Prevention:   safety round/check completed   room organization consistent   nonskid shoes/slippers when out of bed   fall prevention program maintained   clutter free environment maintained   assistive device/personal items within reach  Taken 12/27/2023 2000 by Patrice Barragan RN  Safety Promotion/Fall Prevention:   assistive device/personal items within reach   clutter free environment maintained   fall prevention program maintained   nonskid shoes/slippers when out of bed   room organization consistent   safety round/check completed  Intervention: Prevent Skin Injury  Recent Flowsheet Documentation  Taken 12/28/2023 0000 by Patrice Barragan RN  Body Position: left  Skin Protection:   adhesive use limited   incontinence pads utilized   tubing/devices free from skin contact  Taken 12/27/2023 2000 by Patrice Barragan RN  Body Position: left  Skin Protection:   adhesive use limited   incontinence pads utilized   tubing/devices free from skin contact  Intervention: Prevent and Manage VTE (Venous Thromboembolism) Risk  Recent Flowsheet Documentation  Taken 12/27/2023 2000 by Patrice Barragan RN  VTE Prevention/Management: patient refused intervention  Range of Motion: active ROM (range of motion) encouraged  Intervention: Prevent Infection  Recent Flowsheet Documentation  Taken 12/28/2023 0000 by Patrice Barragan RN  Infection Prevention:   environmental surveillance  performed   hand hygiene promoted   personal protective equipment utilized   rest/sleep promoted   single patient room provided  Taken 12/27/2023 2000 by Patrice Barragan RN  Infection Prevention:   environmental surveillance performed   hand hygiene promoted   personal protective equipment utilized   rest/sleep promoted   single patient room provided     Problem: Adult Inpatient Plan of Care  Goal: Absence of Hospital-Acquired Illness or Injury  Intervention: Prevent Skin Injury  Recent Flowsheet Documentation  Taken 12/28/2023 0000 by Patrice Barragan RN  Body Position: left  Skin Protection:   adhesive use limited   incontinence pads utilized   tubing/devices free from skin contact  Taken 12/27/2023 2000 by Patrice Barragan RN  Body Position: left  Skin Protection:   adhesive use limited   incontinence pads utilized   tubing/devices free from skin contact     Problem: Adult Inpatient Plan of Care  Goal: Absence of Hospital-Acquired Illness or Injury  Intervention: Prevent and Manage VTE (Venous Thromboembolism) Risk  Recent Flowsheet Documentation  Taken 12/27/2023 2000 by Patrice Barragan RN  VTE Prevention/Management: patient refused intervention  Range of Motion: active ROM (range of motion) encouraged     Problem: Adult Inpatient Plan of Care  Goal: Absence of Hospital-Acquired Illness or Injury  Intervention: Prevent Infection  Recent Flowsheet Documentation  Taken 12/28/2023 0000 by Patrice Barragan RN  Infection Prevention:   environmental surveillance performed   hand hygiene promoted   personal protective equipment utilized   rest/sleep promoted   single patient room provided  Taken 12/27/2023 2000 by Patrice Barragan RN  Infection Prevention:   environmental surveillance performed   hand hygiene promoted   personal protective equipment utilized   rest/sleep promoted   single patient room provided     Problem: Adult Inpatient Plan of Care  Goal: Optimal Comfort and Wellbeing  Intervention: Provide  Person-Centered Care  Recent Flowsheet Documentation  Taken 12/27/2023 2000 by Patrice Barragan RN  Trust Relationship/Rapport:   care explained   choices provided   thoughts/feelings acknowledged     Problem: Skin Injury Risk Increased  Goal: Skin Health and Integrity  Intervention: Optimize Skin Protection  Recent Flowsheet Documentation  Taken 12/28/2023 0000 by Patrice Barragan RN  Pressure Reduction Techniques:   frequent weight shift encouraged   weight shift assistance provided  Head of Bed (HOB) Positioning: HOB elevated  Pressure Reduction Devices: pressure-redistributing mattress utilized  Skin Protection:   adhesive use limited   incontinence pads utilized   tubing/devices free from skin contact  Taken 12/27/2023 2000 by Patrice Barragan RN  Pressure Reduction Techniques:   frequent weight shift encouraged   weight shift assistance provided  Head of Bed (HOB) Positioning: HOB elevated  Pressure Reduction Devices: pressure-redistributing mattress utilized  Skin Protection:   adhesive use limited   incontinence pads utilized   tubing/devices free from skin contact     Problem: Fall Injury Risk  Goal: Absence of Fall and Fall-Related Injury  Intervention: Identify and Manage Contributors  Recent Flowsheet Documentation  Taken 12/27/2023 2000 by Patrice Barragan RN  Medication Review/Management: medications reviewed  Intervention: Promote Injury-Free Environment  Recent Flowsheet Documentation  Taken 12/28/2023 0200 by Patrice Barragan RN  Safety Promotion/Fall Prevention:   safety round/check completed   room organization consistent   nonskid shoes/slippers when out of bed   fall prevention program maintained   assistive device/personal items within reach   clutter free environment maintained  Taken 12/28/2023 0000 by Patrice Barragan RN  Safety Promotion/Fall Prevention:   safety round/check completed   room organization consistent   nonskid shoes/slippers when out of bed   fall prevention program  maintained   clutter free environment maintained   assistive device/personal items within reach  Taken 12/27/2023 2000 by Patrice Barragan RN  Safety Promotion/Fall Prevention:   assistive device/personal items within reach   clutter free environment maintained   fall prevention program maintained   nonskid shoes/slippers when out of bed   room organization consistent   safety round/check completed     Problem: Fall Injury Risk  Goal: Absence of Fall and Fall-Related Injury  Intervention: Promote Injury-Free Environment  Recent Flowsheet Documentation  Taken 12/28/2023 0200 by Patrice Barragan RN  Safety Promotion/Fall Prevention:   safety round/check completed   room organization consistent   nonskid shoes/slippers when out of bed   fall prevention program maintained   assistive device/personal items within reach   clutter free environment maintained  Taken 12/28/2023 0000 by Patrice Barragan RN  Safety Promotion/Fall Prevention:   safety round/check completed   room organization consistent   nonskid shoes/slippers when out of bed   fall prevention program maintained   clutter free environment maintained   assistive device/personal items within reach  Taken 12/27/2023 2000 by Patrice Barragan RN  Safety Promotion/Fall Prevention:   assistive device/personal items within reach   clutter free environment maintained   fall prevention program maintained   nonskid shoes/slippers when out of bed   room organization consistent   safety round/check completed   Goal Outcome Evaluation:      Patient has been resting comfortably over night with no complaints. Patient is calm and cooperative with care.                    Multip with IUP @ 39wks here for repeat  section. GBS neg. Maternal/fetal status reassuring    -admit  -NPO  -routine  orders  -consents explained and signed  all questions and concerns answered  ppalos

## 2024-01-22 ENCOUNTER — APPOINTMENT (OUTPATIENT)
Dept: ANTEPARTUM | Facility: CLINIC | Age: 29
End: 2024-01-22
Payer: COMMERCIAL

## 2024-01-22 ENCOUNTER — ASOB RESULT (OUTPATIENT)
Age: 29
End: 2024-01-22

## 2024-01-22 PROCEDURE — 76811 OB US DETAILED SNGL FETUS: CPT

## 2024-01-22 PROCEDURE — 76817 TRANSVAGINAL US OBSTETRIC: CPT

## 2024-01-25 NOTE — BRIEF OPERATIVE NOTE - PROCEDURE
<<-----Click on this checkbox to enter Procedure  delivery only  2017    Active  FEDERICO good, to achieve stated therapy goals

## 2024-02-12 ENCOUNTER — ASOB RESULT (OUTPATIENT)
Age: 29
End: 2024-02-12

## 2024-02-12 ENCOUNTER — APPOINTMENT (OUTPATIENT)
Dept: ANTEPARTUM | Facility: CLINIC | Age: 29
End: 2024-02-12
Payer: COMMERCIAL

## 2024-02-12 ENCOUNTER — NON-APPOINTMENT (OUTPATIENT)
Age: 29
End: 2024-02-12

## 2024-02-12 PROCEDURE — 76816 OB US FOLLOW-UP PER FETUS: CPT

## 2024-02-22 ENCOUNTER — APPOINTMENT (OUTPATIENT)
Dept: ANTEPARTUM | Facility: CLINIC | Age: 29
End: 2024-02-22
Payer: COMMERCIAL

## 2024-02-22 ENCOUNTER — ASOB RESULT (OUTPATIENT)
Age: 29
End: 2024-02-22

## 2024-02-22 DIAGNOSIS — O35.9XX0 MATERNAL CARE FOR (SUSPECTED) FETAL ABNORMALITY AND DAMAGE, UNSPECIFIED, NOT APPLICABLE OR UNSPECIFIED: ICD-10-CM

## 2024-02-22 PROCEDURE — 76816 OB US FOLLOW-UP PER FETUS: CPT

## 2024-03-29 ENCOUNTER — APPOINTMENT (OUTPATIENT)
Dept: ANTEPARTUM | Facility: CLINIC | Age: 29
End: 2024-03-29
Payer: COMMERCIAL

## 2024-03-29 ENCOUNTER — ASOB RESULT (OUTPATIENT)
Age: 29
End: 2024-03-29

## 2024-03-29 PROCEDURE — 76816 OB US FOLLOW-UP PER FETUS: CPT

## 2024-05-10 ENCOUNTER — APPOINTMENT (OUTPATIENT)
Dept: ANTEPARTUM | Facility: CLINIC | Age: 29
End: 2024-05-10
Payer: COMMERCIAL

## 2024-05-10 ENCOUNTER — ASOB RESULT (OUTPATIENT)
Age: 29
End: 2024-05-10

## 2024-05-10 ENCOUNTER — APPOINTMENT (OUTPATIENT)
Dept: ANTEPARTUM | Facility: CLINIC | Age: 29
End: 2024-05-10
Payer: MEDICAID

## 2024-05-10 PROCEDURE — ZZZZZ: CPT

## 2024-05-10 PROCEDURE — 76818 FETAL BIOPHYS PROFILE W/NST: CPT

## 2024-05-10 PROCEDURE — 76816 OB US FOLLOW-UP PER FETUS: CPT

## 2024-05-17 ENCOUNTER — APPOINTMENT (OUTPATIENT)
Dept: ANTEPARTUM | Facility: CLINIC | Age: 29
End: 2024-05-17
Payer: COMMERCIAL

## 2024-05-17 ENCOUNTER — TRANSCRIPTION ENCOUNTER (OUTPATIENT)
Age: 29
End: 2024-05-17

## 2024-05-17 ENCOUNTER — ASOB RESULT (OUTPATIENT)
Age: 29
End: 2024-05-17

## 2024-05-17 PROCEDURE — 76818 FETAL BIOPHYS PROFILE W/NST: CPT

## 2024-05-18 ENCOUNTER — TRANSCRIPTION ENCOUNTER (OUTPATIENT)
Age: 29
End: 2024-05-18

## 2024-05-18 ENCOUNTER — INPATIENT (INPATIENT)
Facility: HOSPITAL | Age: 29
LOS: 1 days | Discharge: HOME | DRG: 833 | End: 2024-05-20
Attending: OBSTETRICS & GYNECOLOGY | Admitting: OBSTETRICS & GYNECOLOGY
Payer: COMMERCIAL

## 2024-05-18 VITALS
WEIGHT: 233.03 LBS | TEMPERATURE: 98 F | RESPIRATION RATE: 16 BRPM | HEART RATE: 85 BPM | SYSTOLIC BLOOD PRESSURE: 129 MMHG | DIASTOLIC BLOOD PRESSURE: 76 MMHG | HEIGHT: 61 IN

## 2024-05-18 DIAGNOSIS — O26.893 OTHER SPECIFIED PREGNANCY RELATED CONDITIONS, THIRD TRIMESTER: ICD-10-CM

## 2024-05-18 DIAGNOSIS — O26.899 OTHER SPECIFIED PREGNANCY RELATED CONDITIONS, UNSPECIFIED TRIMESTER: ICD-10-CM

## 2024-05-18 DIAGNOSIS — Z98.891 HISTORY OF UTERINE SCAR FROM PREVIOUS SURGERY: Chronic | ICD-10-CM

## 2024-05-18 LAB
ANISOCYTOSIS BLD QL: SIGNIFICANT CHANGE UP
BASOPHILS # BLD AUTO: 0.04 K/UL — SIGNIFICANT CHANGE UP (ref 0–0.2)
BASOPHILS NFR BLD AUTO: 0.3 % — SIGNIFICANT CHANGE UP (ref 0–2)
BLD GP AB SCN SERPL QL: SIGNIFICANT CHANGE UP
ELLIPTOCYTES BLD QL SMEAR: SLIGHT — SIGNIFICANT CHANGE UP
EOSINOPHIL # BLD AUTO: 0.13 K/UL — SIGNIFICANT CHANGE UP (ref 0–0.5)
EOSINOPHIL NFR BLD AUTO: 1 % — SIGNIFICANT CHANGE UP (ref 0–6)
HCT VFR BLD CALC: 35.6 % — SIGNIFICANT CHANGE UP (ref 34.5–45)
HGB BLD-MCNC: 11.5 G/DL — SIGNIFICANT CHANGE UP (ref 11.5–15.5)
IMM GRANULOCYTES NFR BLD AUTO: 0.4 % — SIGNIFICANT CHANGE UP (ref 0–0.9)
LYMPHOCYTES # BLD AUTO: 2.65 K/UL — SIGNIFICANT CHANGE UP (ref 1–3.3)
LYMPHOCYTES # BLD AUTO: 21.1 % — SIGNIFICANT CHANGE UP (ref 13–44)
MACROCYTES BLD QL: SLIGHT — SIGNIFICANT CHANGE UP
MANUAL SMEAR VERIFICATION: SIGNIFICANT CHANGE UP
MCHC RBC-ENTMCNC: 24.2 PG — LOW (ref 27–34)
MCHC RBC-ENTMCNC: 32.3 GM/DL — SIGNIFICANT CHANGE UP (ref 32–36)
MCV RBC AUTO: 74.9 FL — LOW (ref 80–100)
MICROCYTES BLD QL: SIGNIFICANT CHANGE UP
MONOCYTES # BLD AUTO: 0.58 K/UL — SIGNIFICANT CHANGE UP (ref 0–0.9)
MONOCYTES NFR BLD AUTO: 4.6 % — SIGNIFICANT CHANGE UP (ref 2–14)
NEUTROPHILS # BLD AUTO: 9.09 K/UL — HIGH (ref 1.8–7.4)
NEUTROPHILS NFR BLD AUTO: 72.6 % — SIGNIFICANT CHANGE UP (ref 43–77)
OVALOCYTES BLD QL SMEAR: SLIGHT — SIGNIFICANT CHANGE UP
PLAT MORPH BLD: NORMAL — SIGNIFICANT CHANGE UP
PLATELET # BLD AUTO: 331 K/UL — SIGNIFICANT CHANGE UP (ref 150–400)
POIKILOCYTOSIS BLD QL AUTO: SLIGHT — SIGNIFICANT CHANGE UP
POLYCHROMASIA BLD QL SMEAR: SIGNIFICANT CHANGE UP
RBC # BLD: 4.75 M/UL — SIGNIFICANT CHANGE UP (ref 3.8–5.2)
RBC # FLD: 15.9 % — HIGH (ref 10.3–14.5)
RBC BLD AUTO: ABNORMAL
SCHISTOCYTES BLD QL AUTO: SLIGHT — SIGNIFICANT CHANGE UP
T PALLIDUM AB TITR SER: NEGATIVE — SIGNIFICANT CHANGE UP
WBC # BLD: 12.54 K/UL — HIGH (ref 3.8–10.5)
WBC # FLD AUTO: 12.54 K/UL — HIGH (ref 3.8–10.5)

## 2024-05-18 RX ORDER — KETOROLAC TROMETHAMINE 30 MG/ML
30 SYRINGE (ML) INJECTION EVERY 6 HOURS
Refills: 0 | Status: DISCONTINUED | OUTPATIENT
Start: 2024-05-18 | End: 2024-05-19

## 2024-05-18 RX ORDER — CEFAZOLIN SODIUM 1 G
2000 VIAL (EA) INJECTION ONCE
Refills: 0 | Status: COMPLETED | OUTPATIENT
Start: 2024-05-18 | End: 2024-05-18

## 2024-05-18 RX ORDER — TETANUS TOXOID, REDUCED DIPHTHERIA TOXOID AND ACELLULAR PERTUSSIS VACCINE, ADSORBED 5; 2.5; 8; 8; 2.5 [IU]/.5ML; [IU]/.5ML; UG/.5ML; UG/.5ML; UG/.5ML
0.5 SUSPENSION INTRAMUSCULAR ONCE
Refills: 0 | Status: DISCONTINUED | OUTPATIENT
Start: 2024-05-18 | End: 2024-05-20

## 2024-05-18 RX ORDER — SODIUM CHLORIDE 9 MG/ML
1000 INJECTION, SOLUTION INTRAVENOUS
Refills: 0 | Status: DISCONTINUED | OUTPATIENT
Start: 2024-05-18 | End: 2024-05-20

## 2024-05-18 RX ORDER — ACETAMINOPHEN 500 MG
975 TABLET ORAL
Refills: 0 | Status: DISCONTINUED | OUTPATIENT
Start: 2024-05-18 | End: 2024-05-20

## 2024-05-18 RX ORDER — IBUPROFEN 200 MG
600 TABLET ORAL EVERY 6 HOURS
Refills: 0 | Status: COMPLETED | OUTPATIENT
Start: 2024-05-18 | End: 2025-04-16

## 2024-05-18 RX ORDER — OXYCODONE HYDROCHLORIDE 5 MG/1
5 TABLET ORAL
Refills: 0 | Status: DISCONTINUED | OUTPATIENT
Start: 2024-05-18 | End: 2024-05-20

## 2024-05-18 RX ORDER — SIMETHICONE 80 MG/1
80 TABLET, CHEWABLE ORAL EVERY 4 HOURS
Refills: 0 | Status: DISCONTINUED | OUTPATIENT
Start: 2024-05-18 | End: 2024-05-20

## 2024-05-18 RX ORDER — CITRIC ACID/SODIUM CITRATE 300-500 MG
30 SOLUTION, ORAL ORAL ONCE
Refills: 0 | Status: COMPLETED | OUTPATIENT
Start: 2024-05-18 | End: 2024-05-18

## 2024-05-18 RX ORDER — TRANEXAMIC ACID 100 MG/ML
1000 INJECTION, SOLUTION INTRAVENOUS ONCE
Refills: 0 | Status: COMPLETED | OUTPATIENT
Start: 2024-05-18 | End: 2024-05-18

## 2024-05-18 RX ORDER — LANOLIN
1 OINTMENT (GRAM) TOPICAL EVERY 6 HOURS
Refills: 0 | Status: DISCONTINUED | OUTPATIENT
Start: 2024-05-18 | End: 2024-05-20

## 2024-05-18 RX ORDER — AZITHROMYCIN 500 MG/1
500 TABLET, FILM COATED ORAL ONCE
Refills: 0 | Status: COMPLETED | OUTPATIENT
Start: 2024-05-18 | End: 2024-05-18

## 2024-05-18 RX ORDER — SODIUM CHLORIDE 9 MG/ML
1000 INJECTION, SOLUTION INTRAVENOUS
Refills: 0 | Status: DISCONTINUED | OUTPATIENT
Start: 2024-05-18 | End: 2024-05-18

## 2024-05-18 RX ORDER — ACETAMINOPHEN 500 MG
975 TABLET ORAL ONCE
Refills: 0 | Status: COMPLETED | OUTPATIENT
Start: 2024-05-18 | End: 2024-05-18

## 2024-05-18 RX ORDER — OXYTOCIN 10 UNIT/ML
333.33 VIAL (ML) INJECTION
Qty: 20 | Refills: 0 | Status: DISCONTINUED | OUTPATIENT
Start: 2024-05-18 | End: 2024-05-20

## 2024-05-18 RX ORDER — MAGNESIUM HYDROXIDE 400 MG/1
30 TABLET, CHEWABLE ORAL
Refills: 0 | Status: DISCONTINUED | OUTPATIENT
Start: 2024-05-18 | End: 2024-05-20

## 2024-05-18 RX ORDER — FAMOTIDINE 10 MG/ML
20 INJECTION INTRAVENOUS ONCE
Refills: 0 | Status: COMPLETED | OUTPATIENT
Start: 2024-05-18 | End: 2024-05-18

## 2024-05-18 RX ORDER — CHLORHEXIDINE GLUCONATE 213 G/1000ML
1 SOLUTION TOPICAL DAILY
Refills: 0 | Status: DISCONTINUED | OUTPATIENT
Start: 2024-05-18 | End: 2024-05-18

## 2024-05-18 RX ORDER — DIPHENHYDRAMINE HCL 50 MG
25 CAPSULE ORAL EVERY 6 HOURS
Refills: 0 | Status: DISCONTINUED | OUTPATIENT
Start: 2024-05-18 | End: 2024-05-20

## 2024-05-18 RX ORDER — ENOXAPARIN SODIUM 100 MG/ML
60 INJECTION SUBCUTANEOUS EVERY 24 HOURS
Refills: 0 | Status: DISCONTINUED | OUTPATIENT
Start: 2024-05-18 | End: 2024-05-20

## 2024-05-18 RX ORDER — SCOPALAMINE 1 MG/3D
1 PATCH, EXTENDED RELEASE TRANSDERMAL ONCE
Refills: 0 | Status: COMPLETED | OUTPATIENT
Start: 2024-05-18 | End: 2024-05-18

## 2024-05-18 RX ORDER — OXYCODONE HYDROCHLORIDE 5 MG/1
5 TABLET ORAL ONCE
Refills: 0 | Status: DISCONTINUED | OUTPATIENT
Start: 2024-05-18 | End: 2024-05-20

## 2024-05-18 RX ADMIN — Medication 30 MILLIGRAM(S): at 15:20

## 2024-05-18 RX ADMIN — Medication 30 MILLIGRAM(S): at 09:28

## 2024-05-18 RX ADMIN — Medication 30 MILLIGRAM(S): at 20:38

## 2024-05-18 RX ADMIN — Medication 1000 MILLIUNIT(S)/MIN: at 08:04

## 2024-05-18 RX ADMIN — Medication 975 MILLIGRAM(S): at 14:00

## 2024-05-18 RX ADMIN — AZITHROMYCIN 255 MILLIGRAM(S): 500 TABLET, FILM COATED ORAL at 07:48

## 2024-05-18 RX ADMIN — SODIUM CHLORIDE 125 MILLILITER(S): 9 INJECTION, SOLUTION INTRAVENOUS at 07:15

## 2024-05-18 RX ADMIN — SCOPALAMINE 1 PATCH: 1 PATCH, EXTENDED RELEASE TRANSDERMAL at 09:21

## 2024-05-18 RX ADMIN — SODIUM CHLORIDE 200 MILLILITER(S): 9 INJECTION, SOLUTION INTRAVENOUS at 06:15

## 2024-05-18 RX ADMIN — Medication 30 MILLILITER(S): at 06:34

## 2024-05-18 RX ADMIN — SCOPALAMINE 1 PATCH: 1 PATCH, EXTENDED RELEASE TRANSDERMAL at 06:34

## 2024-05-18 RX ADMIN — Medication 30 MILLIGRAM(S): at 14:56

## 2024-05-18 RX ADMIN — Medication 975 MILLIGRAM(S): at 17:52

## 2024-05-18 RX ADMIN — FAMOTIDINE 20 MILLIGRAM(S): 10 INJECTION INTRAVENOUS at 06:34

## 2024-05-18 RX ADMIN — Medication 975 MILLIGRAM(S): at 06:34

## 2024-05-18 RX ADMIN — Medication 975 MILLIGRAM(S): at 07:38

## 2024-05-18 RX ADMIN — Medication 2000 MILLIGRAM(S): at 07:47

## 2024-05-18 RX ADMIN — Medication 975 MILLIGRAM(S): at 13:03

## 2024-05-18 RX ADMIN — Medication 30 MILLIGRAM(S): at 08:04

## 2024-05-18 RX ADMIN — ENOXAPARIN SODIUM 60 MILLIGRAM(S): 100 INJECTION SUBCUTANEOUS at 20:11

## 2024-05-18 RX ADMIN — TRANEXAMIC ACID 220 MILLIGRAM(S): 100 INJECTION, SOLUTION INTRAVENOUS at 07:44

## 2024-05-18 NOTE — OB RN PATIENT PROFILE - FALL HARM RISK - UNIVERSAL INTERVENTIONS
Bed in lowest position, wheels locked, appropriate side rails in place/Call bell, personal items and telephone in reach/Instruct patient to call for assistance before getting out of bed or chair/Non-slip footwear when patient is out of bed/[place] to call system/Physically safe environment - no spills, clutter or unnecessary equipment/Purposeful Proactive Rounding/Room/bathroom lighting operational, light cord in reach

## 2024-05-18 NOTE — DISCHARGE NOTE OB - CARE PROVIDER_API CALL
Arun Martinez  Obstetrics and Gynecology  [facility] [place] [address]  Phone: [phone]  Fax: [phone]  Established Patient  Follow Up Time: 2 weeks

## 2024-05-18 NOTE — OB RN DELIVERY SUMMARY - NS_SEPSISRSKCALC_OBGYN_ALL_OB_FT
No temperature has been documented for this patient in CPN or on the OB Flowsheet. Ensure the highest temperature during labor was documented on the OB Flowsheet.  Rupture of membranes must be entered above.   EOS calculated successfully. EOS Risk Factor: 0.5/1000 live births ([facility] [place] national incidence); GA=37w5d; Temp=98.42; ROM=0; GBS='Negative'; Antibiotics='No antibiotics or any antibiotics < 2 hrs prior to birth'

## 2024-05-18 NOTE — DISCHARGE NOTE OB - AVOID PROLONGED STANDING
2/2 E coli bacteremia  No clear source for now, possibly biliary tract          Plan:  Plan as per above Statement Selected

## 2024-05-18 NOTE — DISCHARGE NOTE OB - PHYSICIAN SECTION COMPLETE
For information on Fall & Injury Prevention, visit: [email or web address] OR  [email or web address] OR  https://www.cdc.gov/steadi/patient.html Yes

## 2024-05-18 NOTE — DISCHARGE NOTE OB - HOSPITAL COURSE
Patient admitted due to evidence of labor with prior CSx2. Patient then underwent repeat  delivery under spinal anesthesia in the OR. Post-operatively, she was transferred to the postpartum unit and monitored by the OBGYN teams following enhanced recovery after surgery protocols. Upon discharge she is voiding, tolerating PO, ambulating, and pain is controlled. Patient has no complaints at this time and endorses she is ready to go home.

## 2024-05-18 NOTE — OB RN INTRAOPERATIVE NOTE - NS_COMPLICATIONS_OBGYN_ALL_OB_FT
Post-Care Instructions: Given post care kit and instructions. After the procedure, take precautions agains sun exposure. Do not apply sunscreen for 12 hours after the procedure. Do not apply make-up for 12 hours after the procedure. Avoid alcohol based toners for 10-14 days. After 5 days patients can return to their regular skin regimen. none noted *no cord gases done or G6PD done = QNS*

## 2024-05-18 NOTE — OB PROVIDER H&P - NSHPPHYSICALEXAM_GEN_ALL_CORE
OBJECTIVE:  Vital Signs Last 24 Hrs  HR: 85 (18 May 2024 06:03) (85 - 85)  BP: 129/76 (18 May 2024 06:03) (129/76 - 129/76)    Physical Exam:  Gen: NAD, well-appearing, AAOx3   Abd: Soft, gravid  Ext: non-tender, non-edematous  SVE: 3/90/-2, intact membranes  Nurse present at bedside during all components of physical exam.     Bedside sono: cephalic, anterior  FHT: Baseline 140bpm, mod jimmie, +accel, -decel  [place]: isolated ctx  [place]:

## 2024-05-18 NOTE — DISCHARGE NOTE OB - PLAN OF CARE
1) Please take ibuprofen and/or Tylenol as needed for pain as prescribed.  2) Nothing in the vagina for 6 weeks (including no sex, no tampons, and no douching).  3) Please call your doctor for a follow up your postpartum appointment in 1-2 weeks.  4) Please continue taking vitamins postpartum.   5) Please call the office sooner if you have heavy vaginal bleeding, severe abdominal pain, or fever > 100.4F.  6) You may resume regular daily activity as tolerated Patient with mild anemia secondary to acute blood loss with delivery, stable. Patient should follow with OB  at regular postpartum check, and to call doctor sooner if develop symptoms of anemia such as shortness of breath, lightheadedness, or fainting. If medication such as ferrous sulfate is prescribed, take as directed.

## 2024-05-18 NOTE — OB PROVIDER DELIVERY SUMMARY - NSSELHIDDEN_OBGYN_ALL_OB_FT
[NS_DeliveryAttending1_OBGYN_ALL_OB_FT:[email or web address]],[NS_DeliveryAssist1_OBGYN_ALL_OB_FT:[id number]],[NS_DeliveryRN_OBGYN_ALL_OB_FT:[id number]]

## 2024-05-18 NOTE — DISCHARGE NOTE OB - PATIENT PORTAL LINK FT
You can access the FollowMyHealth Patient Portal offered by [facility] by registering at the following website: [email or web address]. By joining [employer]’s FollowMyHealth portal, you will also be able to view your health information using other applications (apps) compatible with our system.

## 2024-05-18 NOTE — DISCHARGE NOTE OB - MEDICATION SUMMARY - MEDICATIONS TO TAKE
I will START or STAY ON the medications listed below when I get home from the hospital:    ibuprofen 600 mg oral tablet  -- 1 tab(s) by mouth every 6 hours as needed for -for mild pain - for moderate pain  -- Do not take this drug if you are pregnant.  It is very important that you take or use this exactly as directed.  Do not skip doses or discontinue unless directed by your doctor.  May cause drowsiness or dizziness.  Obtain medical advice before taking any non-prescription drugs as some may affect the action of this medication.  Take with food or milk.  -- Indication: For pain    acetaminophen 325 mg oral tablet  -- 2 tab(s) by mouth every 6 hours as needed for -for mild pain - for moderate pain  -- This product contains acetaminophen.  Do not use  with any other product containing acetaminophen to prevent possible liver damage.  -- Indication: For pain    ferrous sulfate 325 mg (65 mg elemental iron) oral tablet  -- 1 tab(s) by mouth once a day  -- Check with your doctor before becoming pregnant.  Do not chew, break, or crush.  May discolor urine or feces.  -- Indication: For anemia

## 2024-05-18 NOTE — DISCHARGE NOTE OB - CARE PLAN
Principal Discharge DX:	 delivery delivered  Assessment and plan of treatment:	1) Please take ibuprofen and/or Tylenol as needed for pain as prescribed.  2) Nothing in the vagina for 6 weeks (including no sex, no tampons, and no douching).  3) Please call your doctor for a follow up your postpartum appointment in 1-2 weeks.  4) Please continue taking vitamins postpartum.   5) Please call the office sooner if you have heavy vaginal bleeding, severe abdominal pain, or fever > 100.4F.  6) You may resume regular daily activity as tolerated  Secondary Diagnosis:	Anemia due to acute blood loss  Assessment and plan of treatment:	Patient with mild anemia secondary to acute blood loss with delivery, stable. Patient should follow with OB  at regular postpartum check, and to call doctor sooner if develop symptoms of anemia such as shortness of breath, lightheadedness, or fainting. If medication such as ferrous sulfate is prescribed, take as directed.   1

## 2024-05-18 NOTE — OB PROVIDER H&P - ASSESSMENT
A/P: KALEB SMITH is a 28y  at 37wk5d here for rCS for prior CSx2.  - Admit to L&D  - Consent  - Admission labs ordered  - IV fluids  - Labor: 3/90/-2, intact  - Fetus: Cat I tracing. Continuous toco and fetal monitoring.   - GBS: negative  - Analgesia desired: spinal    Discussed with Dr. Martinez

## 2024-05-18 NOTE — OB PROVIDER H&P - HISTORY OF PRESENT ILLNESS
27 yo  presents to [facility] L&D triage due to contractions since last night. She reports she has been having continuous ctx, denies any vb or lof. She reports 2 previous CS, uncomplicated per patient. She received 1u pRBC after her first delivery but is not sure why.     Patient receives care with [facility]  Pregnancy course:  Obesity  Prior CSx2    POBHx:   pCS 17 8lbs 4 oz [facility]  rCS 20 6lbs 6 oz  SAB   PGynxHx:   PMHx: Obese, vit D deficient, prediabetic,   PSHx: CSx2  Meds:  All: nkda

## 2024-05-18 NOTE — OB RN PATIENT PROFILE - PRO HBSAG INFANT
Pending Prescriptions:                       Disp   Refills    cyclobenzaprine (FLEXERIL) 10 MG tablet [*30 tab*0            Sig: TAKE ONE TABLET BY MOUTH ONCE DAILY AS NEEDED          Last Written Prescription Date:  06/27/2017  Last Fill Quantity: 30,   # refills: 0  Last Office Visit with [facility], [facility] or [facility] prescribing provider: 06/22/2017  Future Office visit:       Routing refill request to provider for review/approval because:  Drug not on the [facility], [facility] or [facility] refill protocol or controlled substance    Myron DAVIS   negative

## 2024-05-18 NOTE — OB RN PATIENT PROFILE - NSTOBACCONEVERSMOKERY/N_GEN_A
----- Message from Racheal Wan sent at 3/19/2024  9:39 AM CDT -----  Type:  Needs Medical Advice    Who Called:  Pt   Would the patient rather a call back or a response via [facility]? Callback   Best Call Back Number:  [phone]  Additional Information: Caller is requesting a callback from this provider office in regards to arrival time for appt         
Spoke with patient. Confirmed arrival time of 7a for procedure on 3/22.  
No

## 2024-05-18 NOTE — OB RN DELIVERY SUMMARY - NSSELHIDDEN_OBGYN_ALL_OB_FT
[NS_DeliveryAttending1_OBGYN_ALL_OB_FT:[email or web address]=],[NS_DeliveryAssist1_OBGYN_ALL_OB_FT:[id number]=],[NS_DeliveryRN_OBGYN_ALL_OB_FT:[id number]==]

## 2024-05-18 NOTE — OB PROVIDER DELIVERY SUMMARY - NSPROVIDERDELIVERYNOTE_OBGYN_ALL_OB_FT
Brief  Delivery Summary    Procedure: rLTCS in labor at 38w3d GA  Findings: Viable female infant, apgars 9/9, weight 2675g,  cephalic presentation. Grossly normal uterus, fallopian tubes and ovaries.   Single layer uterine closure  SubQ skin closure  EBL: 716  UOP: 30 (patient voided prior to case)  Fluids in OR: 1000cc   Complications: Nuchal cord x1, reduced     Dictation #: [id number]

## 2024-05-18 NOTE — DISCHARGE NOTE OB - CARE PROVIDERS DIRECT ADDRESSES
[email or web address]

## 2024-05-18 NOTE — OB RN DELIVERY SUMMARY - NS_TIMEOFTRANSA_OBGYN_ALL_OB_DT
· Patient's baseline creatinine 2.7-3.2 consistent with CKD stage 4 but creatinine 3.8 on admit--> 4.2 now Patient has had creatinines this high in past 1 year. Patient with previous kidney transplant and last in 2005.   · US showing elevated resistance indices, Pr/Cr 2 grams.   · Consult Kidney Transplant medicine- rec hold lasix today and light IVF for 1L/24 hours. .   · Strict I+Os to closely monitor urine output.   · Avoid any nephrotoxic agents and meds.   · Renally adjust all medications based on GFR.   · Monitor daily BMP to follow renal function.    18-May-2024 11:15

## 2024-05-19 LAB
BASOPHILS # BLD AUTO: 0.03 K/UL — SIGNIFICANT CHANGE UP (ref 0–0.2)
BASOPHILS NFR BLD AUTO: 0.3 % — SIGNIFICANT CHANGE UP (ref 0–2)
EOSINOPHIL # BLD AUTO: 0.06 K/UL — SIGNIFICANT CHANGE UP (ref 0–0.5)
EOSINOPHIL NFR BLD AUTO: 0.5 % — SIGNIFICANT CHANGE UP (ref 0–6)
HCT VFR BLD CALC: 27 % — LOW (ref 34.5–45)
HGB BLD-MCNC: 8.6 G/DL — LOW (ref 11.5–15.5)
IMM GRANULOCYTES NFR BLD AUTO: 0.4 % — SIGNIFICANT CHANGE UP (ref 0–0.9)
LYMPHOCYTES # BLD AUTO: 3.48 K/UL — HIGH (ref 1–3.3)
LYMPHOCYTES # BLD AUTO: 30.4 % — SIGNIFICANT CHANGE UP (ref 13–44)
MCHC RBC-ENTMCNC: 24.6 PG — LOW (ref 27–34)
MCHC RBC-ENTMCNC: 31.9 GM/DL — LOW (ref 32–36)
MCV RBC AUTO: 77.1 FL — LOW (ref 80–100)
MONOCYTES # BLD AUTO: 0.7 K/UL — SIGNIFICANT CHANGE UP (ref 0–0.9)
MONOCYTES NFR BLD AUTO: 6.1 % — SIGNIFICANT CHANGE UP (ref 2–14)
NEUTROPHILS # BLD AUTO: 7.14 K/UL — SIGNIFICANT CHANGE UP (ref 1.8–7.4)
NEUTROPHILS NFR BLD AUTO: 62.3 % — SIGNIFICANT CHANGE UP (ref 43–77)
PLATELET # BLD AUTO: 245 K/UL — SIGNIFICANT CHANGE UP (ref 150–400)
RBC # BLD: 3.5 M/UL — LOW (ref 3.8–5.2)
RBC # FLD: 15.9 % — HIGH (ref 10.3–14.5)
WBC # BLD: 11.46 K/UL — HIGH (ref 3.8–10.5)
WBC # FLD AUTO: 11.46 K/UL — HIGH (ref 3.8–10.5)

## 2024-05-19 RX ORDER — FERROUS SULFATE 325(65) MG
1 TABLET ORAL
Qty: 30 | Refills: 1
Start: 2024-05-19 | End: 2024-07-17

## 2024-05-19 RX ORDER — ACETAMINOPHEN 500 MG
2 TABLET ORAL
Qty: 112 | Refills: 0
Start: 2024-05-19 | End: 2024-06-01

## 2024-05-19 RX ORDER — IBUPROFEN 200 MG
1 TABLET ORAL
Qty: 56 | Refills: 0
Start: 2024-05-19 | End: 2024-06-01

## 2024-05-19 RX ORDER — IBUPROFEN 200 MG
600 TABLET ORAL EVERY 6 HOURS
Refills: 0 | Status: DISCONTINUED | OUTPATIENT
Start: 2024-05-19 | End: 2024-05-20

## 2024-05-19 RX ADMIN — Medication 30 MILLIGRAM(S): at 02:42

## 2024-05-19 RX ADMIN — Medication 975 MILLIGRAM(S): at 17:27

## 2024-05-19 RX ADMIN — Medication 975 MILLIGRAM(S): at 11:04

## 2024-05-19 RX ADMIN — ENOXAPARIN SODIUM 60 MILLIGRAM(S): 100 INJECTION SUBCUTANEOUS at 20:42

## 2024-05-19 RX ADMIN — Medication 600 MILLIGRAM(S): at 20:43

## 2024-05-19 RX ADMIN — Medication 600 MILLIGRAM(S): at 15:03

## 2024-05-19 RX ADMIN — Medication 975 MILLIGRAM(S): at 23:05

## 2024-05-19 RX ADMIN — Medication 600 MILLIGRAM(S): at 09:31

## 2024-05-19 RX ADMIN — Medication 975 MILLIGRAM(S): at 00:05

## 2024-05-19 RX ADMIN — Medication 975 MILLIGRAM(S): at 05:57

## 2024-05-19 NOTE — PROGRESS NOTE ADULT - SUBJECTIVE AND OBJECTIVE BOX
27yo   Post repeat  day # 1  no complaints    exam wnl  ICU Vital Signs Last 24 Hrs  T(C): 36.8 (19 May 2024 04:18), Max: 37 (18 May 2024 09:05)  T(F): 98.3 (19 May 2024 04:18), Max: 98.6 (18 May 2024 09:05)  HR: 66 (19 May 2024 04:18) (60 - 78)  BP: 105/64 (19 May 2024 04:18) (90/57 - 112/61)  BP(mean): --  ABP: --  ABP(mean): --  RR: 18 (19 May 2024 04:18) (16 - 18)  SpO2: 99% (19 May 2024 04:18) (99% - 100%)    O2 Parameters below as of 19 May 2024 04:18  Patient On (Oxygen Delivery Method): room air    CBC Full  -  ( 19 May 2024 05:55 )  WBC Count : 11.46 K/uL  RBC Count : 3.50 M/uL  Hemoglobin : 8.6 g/dL  Hematocrit : 27.0 %  Platelet Count - Automated : 245 K/uL  Mean Cell Volume : 77.1 fl  Mean Cell Hemoglobin : 24.6 pg  Mean Cell Hemoglobin Concentration : 31.9 gm/dL  Auto Neutrophil # : 7.14 K/uL  Auto Lymphocyte # : 3.48 K/uL  Auto Monocyte # : 0.70 K/uL  Auto Eosinophil # : 0.06 K/uL  Auto Basophil # : 0.03 K/uL  Auto Neutrophil % : 62.3 %  Auto Lymphocyte % : 30.4 %  Auto Monocyte % : 6.1 %  Auto Eosinophil % : 0.5 %  Auto Basophil % : 0.3 %

## 2024-05-20 VITALS
OXYGEN SATURATION: 98 % | DIASTOLIC BLOOD PRESSURE: 68 MMHG | HEART RATE: 70 BPM | TEMPERATURE: 98 F | SYSTOLIC BLOOD PRESSURE: 104 MMHG | RESPIRATION RATE: 18 BRPM

## 2024-05-20 PROCEDURE — 86850 RBC ANTIBODY SCREEN: CPT

## 2024-05-20 PROCEDURE — 59050 FETAL MONITOR W/REPORT: CPT

## 2024-05-20 PROCEDURE — 36415 COLL VENOUS BLD VENIPUNCTURE: CPT

## 2024-05-20 PROCEDURE — 86900 BLOOD TYPING SEROLOGIC ABO: CPT

## 2024-05-20 PROCEDURE — 86780 TREPONEMA PALLIDUM: CPT

## 2024-05-20 PROCEDURE — 86901 BLOOD TYPING SEROLOGIC RH(D): CPT

## 2024-05-20 PROCEDURE — 85025 COMPLETE CBC W/AUTO DIFF WBC: CPT

## 2024-05-20 PROCEDURE — 59025 FETAL NON-STRESS TEST: CPT

## 2024-05-20 PROCEDURE — 90707 MMR VACCINE SC: CPT

## 2024-05-20 RX ADMIN — Medication 975 MILLIGRAM(S): at 05:05

## 2024-05-20 RX ADMIN — Medication 600 MILLIGRAM(S): at 02:09

## 2024-05-20 RX ADMIN — Medication 600 MILLIGRAM(S): at 08:40

## 2024-05-20 RX ADMIN — Medication 0.5 MILLILITER(S): at 12:44

## 2024-05-20 RX ADMIN — Medication 975 MILLIGRAM(S): at 12:44

## 2024-05-20 NOTE — PROGRESS NOTE ADULT - SUBJECTIVE AND OBJECTIVE BOX
INTERVAL HPI/OVERNIGHT EVENTS:  28y Female s/p repeat c section under spinal anesthesia with duramorph for post op analgesia on 05/18/24    Vital Signs Last 24 Hrs  T(C): 36.9 (19 May 2024 15:27), Max: 36.9 (19 May 2024 15:27)  T(F): 98.4 (19 May 2024 15:27), Max: 98.4 (19 May 2024 15:27)  HR: 68 (19 May 2024 15:27) (66 - 68)  BP: 98/61 (19 May 2024 15:27) (98/61 - 105/64)  BP(mean): --  RR: 18 (19 May 2024 15:27) (18 - 18)  SpO2: 100% (19 May 2024 15:27) (99% - 100%)    Parameters below as of 19 May 2024 04:18  Patient On (Oxygen Delivery Method): room air            Patient's overall anesthesia satisfaction: Positive    Patients pain is well controlled with IT duramorph    No respiratory events overnight    No pruritis at this time    Patient doing well     No headache      No residual numbness or weakness, sensory and motor function intact.    No anesthetic complications or complaints noted or reported          .

## 2024-05-20 NOTE — PROGRESS NOTE ADULT - ATTENDING COMMENTS
POD#2  Doing well  meeting milestones  Hgb 11.5 --> 8.6- no signs or sx's of anemia  incision C/D/I  BP wnl  f/u in the office in 1-2 weeks for incision check  Family planning discussed  all questions and concerns discussed   discharge home  ppalos

## 2024-05-20 NOTE — PROGRESS NOTE ADULT - SUBJECTIVE AND OBJECTIVE BOX
KALEB SMITH is a 28y  now POD#2 s/p repeat  section at 37 weeks gestation, uncomplicated.    S:    No acute events overnight.   The patient has no complaints.  Pain controlled with current treatment regimen.   She is ambulating without difficulty and tolerating PO.   + flatus/-BM/+ voiding   She endorses appropriate lochia, which is decreasing.   She denies fevers, chills, nausea and vomiting.   She denies lightheadedness, dizziness, palpitations, chest pain and SOB.     O:    T(C): 36.8 (24 @ 04:12), Max: 36.9 (24 @ 15:27)  HR: 70 (24 @ 04:12) (68 - 70)  BP: 104/68 (24 @ 04:12) (98/61 - 104/68)  RR: 18 (24 @ 04:12) (18 - 18)  SpO2: 98% (24 @ 04:12) (98% - 100%)    Gen: NAD, AOx3  CV: well perfused  Pulm: normal respiratory effort   Abdomen:  Soft, non-tender, non-distended  Incision: LULÚ dressing clean and dry  Uterus:  Fundus firm below umbilicus  VE:  Expected lochia  Ext:  Bilateral lower extremities non-tender and non-edematous                          8.6    11.46 )-----------( 245      ( 19 May 2024 05:55 )             27.0

## 2024-05-20 NOTE — PROGRESS NOTE ADULT - ASSESSMENT
A/P:  28y  now POD#2 s/p repeat  section at 37 weeks gestation, uncomplicated.  -Vital signs stable  -Hgb: 11.5 -> 9.9   -Voiding, tolerating PO, bowel function nml   -Advance care as tolerated   -Continue routine postpartum and postoperative care and education  -Healthy female infant  - DVT ppx: Lovenox ordered/ Ambulation encouraged. SCDs while in bed.   -Dispo: Patient to be discharged when meeting all postpartum and postoperative milestones and pending attending approval. Stable for d/c home

## 2024-05-24 ENCOUNTER — APPOINTMENT (OUTPATIENT)
Dept: ANTEPARTUM | Facility: CLINIC | Age: 29
End: 2024-05-24

## 2024-05-31 ENCOUNTER — APPOINTMENT (OUTPATIENT)
Dept: ANTEPARTUM | Facility: CLINIC | Age: 29
End: 2024-05-31

## 2025-02-16 NOTE — OB RN INTRAOPERATIVE NOTE - NS_ELECTROSURGICALUNITBIOMEDNUMBER_OBGYN_ALL_OB_FT
[id number]

## 2025-04-29 NOTE — OB RN PREOPERATIVE CHECKLIST - ASSESSMENT, HISTORY & PHYSICAL COMPLETED AND ON MEDICAL RECORD
RE: Plan of Care    Julia Prieto PT    Thank you for referring Sharmila Leal. The following information reflects my assessment and plan of care.    Please review and sign the attached form to indicate your approval of the plan of care. Insurance compliance requires your approval be on this plan of care. After your review, please fax back all pages received. Should you have any questions, feel free to contact me.    Julia Prieto PT  [place] [address] [address]  [address] [facility] [address]  Phone: [phone]  Fax: [phone]           Plan of Care 25   Effective from: 2025  Effective to: 6/10/2025    Active  Plan ID: [id number]           Participants as of 2025    Name Type Comments Contact Info    Julia Prieto PT Referring Provider  [phone]    Luis Garcia MD Referring Provider  [phone]           Sharmila Leal MRN:[id number] (:1993 32 year old F)            Evaluation     Author: Julia Prieto PT Status: Sign when Signing Visit Last edited: 2025  7:15 AM       Physical Therapy Progress Note    Visit Type: Progress Note- Daily Treatment Note  Visit: 8  Referring Provider: Julia Prieto PT  Medical Diagnosis (from order): M53.3 - Coccyxdynia     SUBJECTIVE                                                                                                               Patient states her tailbone pain has been pretty bad. Any surface she sits on she has pain most of the time. She even has pain with lying to sleep. She has scheduled an injection for the end of the week.   Functional Change: Minimal improvement in the past 2 weeks.  Current Functional Limitations: Noting increased pain with sitting.      OBJECTIVE                                                                                                                      Palpation  Mild tightness noted in Quadratus lumborum bilateral    Joint Play   Minimal stiffness  remaining in thoracic or lumbar segments.             Pelvic Health  Internal Vaginal Exam  - Ischiocavernosus:        • Left: no tightness       • Right: no tightness  - Superficial transverse perineal:        • Left: tightness and tenderness       • Right: no tightness and no tenderness  -  Bulbocavernosus:        • Left: tightness       • Right: no tightness  - Compressor urethra:        • Left: no tenderness and no tightness       • Right: no tenderness and no tightness  - Pubococcygeus:        • Left: tenderness and tightness       • Right: tightness and no tenderness  - Ischiococcygeus:        • Left: tenderness and tightness       • Right: tightness and no tenderness  - Iliococcygeus:        • Left: tenderness and tightness       • Right: no tenderness and no tightness       Treatment     Therapeutic Exercise  Happy baby x 2 minutes  Reverse clams x 15 each side  Cat/cow x 10 - with manual overpressure on the coccyx - defer today  Seated figure 4 hip stretch 2 x 30 seconds each side  Seated hip internal rotation with green band, ball between knees x 20      Manual Therapy   Reassessment of lumbar region and pelvic floor  Soft tissue mobilization to Quadratus lumborum bilateral    Soft tissue mobilization to left > right Pelvic floor muscles until softening noted      Neuromuscular Re-Education  * cuing for sequencing and recruitment  DEFER today  Isolated pelvic floor recruitment - tactile  Pelvic floor recruitment with hip adduction x 8  Bridge with pelvic floor recruitment x 10  Supine alternating arm and leg reach x 12 each side - cuing for Transverse abdominis recruitment and stability    Defer today  Bird dog arm and leg raises x 10 each side  Toe tap downs, hands under hips for support x 12 - cues for recruitment and exhale      Skilled input: verbal instruction/cues, tactile instruction/cues, as detailed above, facilitation, demonstration and posture correction    Writer verbally educated and received  verbal consent for hand placement, positioning of patient, and techniques to be performed today from patient for clothing adjustments for techniques, hand placement and palpation for techniques and therapist position for techniques as described above and how they are pertinent to the patient's plan of care.  Verbal consent received today for internal, external and external visualization pelvic floor muscle assessment and treatment.   Patient provided continued consent during evaluation and treatment.  Home Exercise Program  Access Code: [email or web address]  URL: [email or web address]  Date: 04/07/2025  Prepared by: Julia Prieto    Program Notes  Try sitting on a lacrosse ball for a pelvic floor stretching. Place the ball at the anus and sit on it on a lightly cushioned chair. 1 minutes, 1-2 times per day.    Exercises  - Happy Baby with Pelvic Floor Lengthening  - 1 x daily - 1 sets - 1-2 reps - 1-2 minute hold  - Prone Alternating Arm and Leg Lifts  - 1 x daily - 1 sets - 10-20 reps  - Cat Cow  - 1 x daily - 1 sets - 10 reps  - Quadruped Full Range Thoracic Rotation with Reach  - 1 x daily - 1 sets - 5-10 reps  - Seated Hip Internal Rotation with Ball and Resistance  - 1 x daily - 1 sets - 10-20 reps  - Standing Hip Hinge with Dowel  - 1 x daily - 1 sets - 10 reps  - Bird Dog  - 1 x daily - 1 sets - 10 reps  - Supine 90/90 Alternating Toe Touch  - 1 x daily - 1 sets - 10 reps  - Supine Pelvic Floor Contraction  - 1 x daily - 1 sets - 10 reps  - Pelvic Floor Contractions in Hooklying with Adduction  - 1 x daily - 1 sets - 10 reps  - Supine Bridge with Pelvic Floor Contraction  - 1 x daily - 1 sets - 10-20 reps  - Seated Pelvic Floor Contraction with Isometric Hip Adduction  - 1 x daily - 1 sets - 10 reps      ASSESSMENT                                                                                                            Gains in skilled therapy to date as expected in symptom reduction initially  but in recent weeks she has noted a significant increase in pain symptoms. Patient demonstrates improved spine mobility with less tightness or stiffness in the thoracic or lumbar regions. Patient still demonstrating some tightness in Quadratus lumborum this session but this may be more closely related to increased lifting intensity while exercising recently. With pelvic floor assessment patient demonstrated tightness in the left > right posterior aspect of the pelvic floor. Tightness reduced significantly with manual therapy this session. After manual therapy patient noted less pain with sitting.  Patient attends therapy as recommended.  Patient reports performing HEP as prescribed.  Progress toward discharge/long term goals (see below for specific status updates): steady progress  Medically necessary skilled therapy interventions continue to be required to allow the patient to maximize their functional abilities as noted above and as noted in goal status.  Education:   - Results of above outlined education: Verbalizes understanding    PLAN                                                                                                                          Continue interventions established at initial evalution. and Extend frequency and duration per below.  Frequency / Duration  1 times per week tapering as patient progresses for 6 weeks for an estimated total of 6 visits    Suggestions for next session as indicated: Progress per plan of care    Goals  Long Term Goals: to be met by end of plan of care  1. Patient will sit x 60 minutes without increased pain so she can sit for a meal with her family.  Status: partially met  Patient noting intermittent improvement in symptoms.  2. Patient will drive/ride in a car x 60 minutes so she can complete daily errands without increased pain or limitation.  Status: partially met Intermittent improvement in symptoms.  3. Patient will lunge to reach the floor and then return  done to standing without pain to aid her ability to complete lifting for childcare and household tasks.  Status: partially met  Intermittent improvement noted - patient can complete the movement but with intermittent pain symptoms.      Therapy procedure time and total treatment time can be found documented on the Time Entry flowsheet             RE: Plan of Care for Sharmila Leal, YOB: 1993     I certify the need for these services, furnished under this plan of treatment and while under my care.  I agree with the plan of care as stated and request that therapy proceed.        __________________________________________________________________________________  Provider Signature         Date